# Patient Record
Sex: FEMALE | Race: OTHER | HISPANIC OR LATINO | Employment: FULL TIME | URBAN - METROPOLITAN AREA
[De-identification: names, ages, dates, MRNs, and addresses within clinical notes are randomized per-mention and may not be internally consistent; named-entity substitution may affect disease eponyms.]

---

## 2021-03-25 ENCOUNTER — OFFICE VISIT (OUTPATIENT)
Dept: OBGYN CLINIC | Facility: MEDICAL CENTER | Age: 21
End: 2021-03-25
Payer: COMMERCIAL

## 2021-03-25 ENCOUNTER — APPOINTMENT (OUTPATIENT)
Dept: RADIOLOGY | Facility: MEDICAL CENTER | Age: 21
End: 2021-03-25
Payer: COMMERCIAL

## 2021-03-25 VITALS
BODY MASS INDEX: 18.77 KG/M2 | HEART RATE: 69 BPM | SYSTOLIC BLOOD PRESSURE: 103 MMHG | HEIGHT: 62 IN | WEIGHT: 102 LBS | DIASTOLIC BLOOD PRESSURE: 69 MMHG

## 2021-03-25 DIAGNOSIS — M41.9 SCOLIOSIS, UNSPECIFIED SCOLIOSIS TYPE, UNSPECIFIED SPINAL REGION: ICD-10-CM

## 2021-03-25 DIAGNOSIS — M54.6 CHRONIC BILATERAL THORACIC BACK PAIN: ICD-10-CM

## 2021-03-25 DIAGNOSIS — R07.89 OTHER CHEST PAIN: Primary | ICD-10-CM

## 2021-03-25 DIAGNOSIS — G89.29 CHRONIC BILATERAL LOW BACK PAIN WITHOUT SCIATICA: ICD-10-CM

## 2021-03-25 DIAGNOSIS — M54.50 CHRONIC BILATERAL LOW BACK PAIN WITHOUT SCIATICA: ICD-10-CM

## 2021-03-25 DIAGNOSIS — G89.29 CHRONIC BILATERAL THORACIC BACK PAIN: ICD-10-CM

## 2021-03-25 DIAGNOSIS — M54.50 ACUTE BILATERAL LOW BACK PAIN WITHOUT SCIATICA: ICD-10-CM

## 2021-03-25 PROCEDURE — 99204 OFFICE O/P NEW MOD 45 MIN: CPT | Performed by: EMERGENCY MEDICINE

## 2021-03-25 PROCEDURE — 72100 X-RAY EXAM L-S SPINE 2/3 VWS: CPT

## 2021-03-25 PROCEDURE — 72070 X-RAY EXAM THORAC SPINE 2VWS: CPT

## 2021-03-25 NOTE — LETTER
March 25, 2021     Patient: Elke Marte   YOB: 1990   Date of Visit: 3/25/2021       To Whom it May Concern:    Elke Marte is under my professional care  She was seen in my office on 3/25/2021  Please accept restrictions at work: Max lifting 30 lbs repetitively  If you have any questions or concerns, please don't hesitate to call           Sincerely,          Emilie Miller MD        CC: No Recipients

## 2021-03-25 NOTE — PROGRESS NOTES
Assessment/Plan:    Diagnoses and all orders for this visit:    Other chest pain  -     XR spine thoracic 2 vw; Future  -     XR spine lumbar 2 or 3 views injury; Future  -     Ambulatory referral to Physical Therapy; Future  -     Ambulatory referral to [facility]; Future  -     XR chest pa & lateral; Future    Chronic bilateral low back pain without sciatica  -     XR spine thoracic 2 vw; Future  -     XR spine lumbar 2 or 3 views injury; Future  -     Ambulatory referral to Physical Therapy; Future    Chronic bilateral thoracic back pain  -     Ambulatory referral to Physical Therapy; Future    Other orders  -     Gianvi 3-0 02 MG per tablet; Take 1 tablet by mouth daily    Recommend PT for chronic intermittent back pain to help with work responsibilities including lifting/bending  CXR today for chest prominence, refer to PCP for further workup of CP  Return if symptoms worsen or fail to improve  Chief Complaint:     Chief Complaint   Patient presents with    Spine - Pain       Subjective:   Patient ID: Tyler Miranda is a 26 y o  female  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 for concerns of chest pain which she will notice when pulling, she also feels her right side of chest is more prominent  She describes it as a cold sensation and burning  She will sometimes experience SOB with this pain, these symtoms usually last about 10 seconds  Also with periods of neck, upper mid and lower back pain and feelings of cold  This is worse with lifting of in a position for periods of time  She notes when she was around 8 her chest bones was prominent and felt moving forward  Patient works at [employer] requiring her to perform repetitive lifting/bending and this can cause pain  She also notes recent b/l knee pain evaluated for this but severe causing her to cry  Review of Systems   Constitutional: Negative for fever  Respiratory: Positive for shortness of breath      Cardiovascular: Positive for chest pain  Gastrointestinal: Negative for abdominal pain  Genitourinary: Negative for difficulty urinating  Musculoskeletal: Positive for arthralgias and back pain  Skin: Negative for rash  Neurological: Negative for weakness  Psychiatric/Behavioral: Negative for suicidal ideas  The following portions of the patient's chart were reviewed and updated as appropriate: Allergy:  No Known Allergies    History reviewed  No pertinent past medical history  History reviewed  No pertinent surgical history  Social History     Socioeconomic History    Marital status: Unknown     Spouse name: Not on file    Number of children: Not on file    Years of education: Not on file    Highest education level: Not on file   Occupational History    Not on file   Social Needs    Financial resource strain: Not on file    Food insecurity     Worry: Not on file     Inability: Not on file    Transportation needs     Medical: Not on file     Non-medical: Not on file   Tobacco Use    Smoking status: Never Smoker    Smokeless tobacco: Never Used   Substance and Sexual Activity    Alcohol use: Not Currently    Drug use: Not Currently    Sexual activity: Not on file   Lifestyle    Physical activity     Days per week: Not on file     Minutes per session: Not on file    Stress: Not on file   Relationships    Social connections     Talks on phone: Not on file     Gets together: Not on file     Attends Jehovah's witness service: Not on file     Active member of club or organization: Not on file     Attends meetings of clubs or organizations: Not on file     Relationship status: Not on file    Intimate partner violence     Fear of current or ex partner: Not on file     Emotionally abused: Not on file     Physically abused: Not on file     Forced sexual activity: Not on file   Other Topics Concern    Not on file   Social History Narrative    Not on file       History reviewed   No pertinent family history  Medications:    Current Outpatient Medications:     Gianvi 3-0 02 MG per tablet, Take 1 tablet by mouth daily, Disp: , Rfl:     There is no problem list on file for this patient  Objective:  /69   Pulse 69   Ht 5' 2" (1 575 m)   Wt 46 3 kg (102 lb)   BMI 18 66 kg/m²     Back Exam     Range of Motion   Extension: normal   Flexion: normal     Other   Erythema: no back redness    Comments: There is mild right thoracic prominence on forward bending  Shoulders and hips level  Nl gait            Physical Exam  Vitals signs reviewed  Constitutional:       Appearance: She is well-developed  HENT:      Head: Normocephalic and atraumatic  Eyes:      Conjunctiva/sclera: Conjunctivae normal    Neck:      Musculoskeletal: Normal range of motion and neck supple  Cardiovascular:      Rate and Rhythm: Normal rate  Comments: Right anterior chest wall prominence  Pulmonary:      Effort: Pulmonary effort is normal  No respiratory distress  Skin:     General: Skin is warm and dry  Neurological:      Mental Status: She is alert and oriented to person, place, and time  Psychiatric:         Behavior: Behavior normal            Neurologic Exam     Mental Status   Oriented to person, place, and time  Procedures    I have personally reviewed pertinent films in PACS

## 2021-03-25 NOTE — LETTER
March 25, 2021     Patient: Danielito Carrion   YOB: 1990   Date of Visit: 3/25/2021       To Whom it May Concern:    Danielito Carrion is under my professional care  She was seen in my office on 3/25/2021  If you have any questions or concerns, please don't hesitate to call           Sincerely,          Saundra Bran MD        CC: No Recipients

## 2021-08-13 ENCOUNTER — OFFICE VISIT (OUTPATIENT)
Dept: FAMILY MEDICINE CLINIC | Facility: CLINIC | Age: 21
End: 2021-08-13
Payer: COMMERCIAL

## 2021-08-13 VITALS
BODY MASS INDEX: 18.03 KG/M2 | HEIGHT: 62 IN | RESPIRATION RATE: 14 BRPM | WEIGHT: 98 LBS | TEMPERATURE: 97.9 F | HEART RATE: 71 BPM | DIASTOLIC BLOOD PRESSURE: 62 MMHG | SYSTOLIC BLOOD PRESSURE: 90 MMHG | OXYGEN SATURATION: 100 %

## 2021-08-13 DIAGNOSIS — Z11.59 ENCOUNTER FOR HEPATITIS C SCREENING TEST FOR LOW RISK PATIENT: ICD-10-CM

## 2021-08-13 DIAGNOSIS — R06.02 SOB (SHORTNESS OF BREATH): ICD-10-CM

## 2021-08-13 DIAGNOSIS — M17.11 OSTEOARTHRITIS OF RIGHT KNEE, UNSPECIFIED OSTEOARTHRITIS TYPE: ICD-10-CM

## 2021-08-13 DIAGNOSIS — Z12.4 SCREENING FOR CERVICAL CANCER: ICD-10-CM

## 2021-08-13 DIAGNOSIS — K21.9 GASTROESOPHAGEAL REFLUX DISEASE WITHOUT ESOPHAGITIS: ICD-10-CM

## 2021-08-13 DIAGNOSIS — Z11.8 SCREENING FOR CHLAMYDIAL DISEASE: ICD-10-CM

## 2021-08-13 DIAGNOSIS — M17.12 OSTEOARTHRITIS OF LEFT KNEE, UNSPECIFIED OSTEOARTHRITIS TYPE: ICD-10-CM

## 2021-08-13 DIAGNOSIS — R07.9 CHEST PAIN, UNSPECIFIED TYPE: ICD-10-CM

## 2021-08-13 DIAGNOSIS — Z11.4 SCREENING FOR HIV (HUMAN IMMUNODEFICIENCY VIRUS): Primary | ICD-10-CM

## 2021-08-13 DIAGNOSIS — J32.9 CHRONIC SINUSITIS, UNSPECIFIED LOCATION: ICD-10-CM

## 2021-08-13 DIAGNOSIS — M19.90 ARTHRITIS: ICD-10-CM

## 2021-08-13 PROCEDURE — 99204 OFFICE O/P NEW MOD 45 MIN: CPT | Performed by: INTERNAL MEDICINE

## 2021-08-13 PROCEDURE — 1036F TOBACCO NON-USER: CPT | Performed by: INTERNAL MEDICINE

## 2021-08-13 PROCEDURE — 3008F BODY MASS INDEX DOCD: CPT | Performed by: INTERNAL MEDICINE

## 2021-08-13 PROCEDURE — 3725F SCREEN DEPRESSION PERFORMED: CPT | Performed by: INTERNAL MEDICINE

## 2021-08-13 NOTE — PROGRESS NOTES
BMI Counseling: Body mass index is 17 92 kg/m²  The BMI is above normal  Exercise recommendations include exercising 3-5 times per week

## 2021-08-13 NOTE — PROGRESS NOTES
Assessment/Plan:         Diagnoses and all orders for this visit:    Screening for HIV (human immunodeficiency virus)  -     HIV 1/2 Antigen/Antibody (4th Generation) w Reflex [facility]; Future    Encounter for hepatitis C screening test for low risk patient  -     Hepatitis C antibody; Future    Screening for chlamydial disease  -     Chlamydia/GC amplified DNA by PCR; Future    Screening for cervical cancer  -     Ambulatory referral to Gynecology; Future    Chest pain, unspecified type; chest wall syndrome  RTC in 1-2 mos  w:  -     CT chest wo contrast; Future  -     UA (URINE) with reflex to Scope; Future  -     Ferritin; Future  -     Comprehensive metabolic panel; Future  -     CBC and differential; Future  -     TSH, 3rd generation; Future  -     T4, free; Future  -     Magnesium; Future  -     Lipid panel; Future  -     Vitamin D 25 hydroxy; Future  -     Vitamin B12; Future    SOB (shortness of breath)  -     CT chest wo contrast; Future  -     Ferritin; Future  -     Comprehensive metabolic panel; Future  -     CBC and differential; Future  -     TSH, 3rd generation; Future  -     T4, free; Future  -     Magnesium; Future  -     Lipid panel; Future  -     Vitamin D 25 hydroxy; Future  -     Vitamin B12; Future  -     H  pylori antigen, stool; Future    Chronic sinusitis, unspecified location  -     CT sinus wo contrast; Future    Osteoarthritis of left knee, unspecified osteoarthritis type  -     XR knee 3 vw right non injury; Future  -     XR knee 3 vw left non injury; Future  -     XR hip/pelv 2-3 vws left if performed; Future  -     XR hip/pelv 2-3 vws right if performed; Future    Osteoarthritis of right knee, unspecified osteoarthritis type  -     XR knee 3 vw right non injury; Future  -     XR knee 3 vw left non injury; Future  -     XR hip/pelv 2-3 vws left if performed; Future  -     XR hip/pelv 2-3 vws right if performed; Future    Arthritis  -     XR hip/pelv 2-3 vws left if performed;  Future  - XR hip/pelv 2-3 vws right if performed; Future  -     Sedimentation rate, automated; Future  -     ASO Screen w/ reflex to Titer; Future  -     OPAL Screen w/ Reflex to Titer/Pattern; Future  -     Anti-DNA antibody, double-stranded; Future  -     Sjogren's Antibodies; Future  -     HLA-B27 antigen; Future    Gastroesophageal reflux disease without esophagitis  -     H  pylori antigen, stool; Future        Subjective:      Patient ID: Clint Monaco is a 19 y o  female  [address] is here for First time, Complete H/P discussed w Pt in detail, No recent Blood work, med list reviewed,       The following portions of the patient's history were reviewed and updated as appropriate: allergies, current medications, past medical history, past social history, past surgical history and problem list     Review of Systems   Constitutional: Negative for chills, fatigue and fever  HENT: Positive for postnasal drip  Negative for congestion, facial swelling, sore throat, trouble swallowing and voice change  Eyes: Negative for pain, discharge and visual disturbance  Respiratory: Positive for shortness of breath  Negative for cough and wheezing  Cardiovascular: Positive for chest pain  Negative for palpitations and leg swelling  Gastrointestinal: Negative for abdominal pain, blood in stool, constipation, diarrhea and nausea  Endocrine: Negative for polydipsia, polyphagia and polyuria  Genitourinary: Negative for difficulty urinating, hematuria and urgency  Musculoskeletal: Positive for arthralgias and back pain  Negative for myalgias  Skin: Negative for rash  Neurological: Negative for dizziness, tremors, weakness and headaches  Hematological: Negative for adenopathy  Does not bruise/bleed easily  Psychiatric/Behavioral: Negative for dysphoric mood, sleep disturbance and suicidal ideas           Objective:      BP 90/62 (BP Location: Left arm, Patient Position: Sitting, Cuff Size: Standard)   Pulse 71   Temp 97 9 °F (36 6 °C) (Tympanic)   Resp 14   Ht 5' 2" (1 575 m)   Wt 44 5 kg (98 lb)   SpO2 100%   BMI 17 92 kg/m²          Physical Exam  Constitutional:       General: She is not in acute distress  HENT:      Head: Normocephalic  Mouth/Throat:      Pharynx: No oropharyngeal exudate  Eyes:      General: No scleral icterus  Conjunctiva/sclera: Conjunctivae normal       Pupils: Pupils are equal, round, and reactive to light  Neck:      Thyroid: No thyromegaly  Cardiovascular:      Rate and Rhythm: Normal rate and regular rhythm  Heart sounds: Normal heart sounds  No murmur heard  Pulmonary:      Effort: Pulmonary effort is normal  No respiratory distress  Breath sounds: Normal breath sounds  No wheezing or rales  Abdominal:      General: Bowel sounds are normal  There is no distension  Palpations: Abdomen is soft  Tenderness: There is no abdominal tenderness  There is no guarding or rebound  Musculoskeletal:         General: Tenderness present  Cervical back: Neck supple  Lymphadenopathy:      Cervical: No cervical adenopathy  Skin:     Coloration: Skin is not pale  Findings: No rash  Neurological:      Mental Status: She is alert and oriented to person, place, and time  Sensory: No sensory deficit  Motor: No weakness

## 2022-02-01 ENCOUNTER — APPOINTMENT (OUTPATIENT)
Dept: LAB | Facility: HOSPITAL | Age: 22
End: 2022-02-01
Payer: COMMERCIAL

## 2022-02-01 DIAGNOSIS — R07.9 CHEST PAIN, UNSPECIFIED TYPE: ICD-10-CM

## 2022-02-01 DIAGNOSIS — K21.9 GASTROESOPHAGEAL REFLUX DISEASE WITHOUT ESOPHAGITIS: ICD-10-CM

## 2022-02-01 DIAGNOSIS — R06.02 SOB (SHORTNESS OF BREATH): ICD-10-CM

## 2022-02-01 DIAGNOSIS — M19.90 ARTHRITIS: ICD-10-CM

## 2022-02-01 LAB
25(OH)D3 SERPL-MCNC: 8.4 NG/ML (ref 30–100)
ALBUMIN SERPL BCP-MCNC: 3.8 G/DL (ref 3.5–5)
ALP SERPL-CCNC: 39 U/L (ref 46–116)
ALT SERPL W P-5'-P-CCNC: 20 U/L (ref 12–78)
ANION GAP SERPL CALCULATED.3IONS-SCNC: 10 MMOL/L (ref 4–13)
AST SERPL W P-5'-P-CCNC: 14 U/L (ref 5–45)
BASOPHILS # BLD AUTO: 0.03 THOUSANDS/ΜL (ref 0–0.1)
BASOPHILS NFR BLD AUTO: 1 % (ref 0–1)
BILIRUB SERPL-MCNC: 0.27 MG/DL (ref 0.2–1)
BUN SERPL-MCNC: 11 MG/DL (ref 5–25)
CALCIUM SERPL-MCNC: 9 MG/DL (ref 8.3–10.1)
CHLORIDE SERPL-SCNC: 104 MMOL/L (ref 100–108)
CHOLEST SERPL-MCNC: 249 MG/DL
CO2 SERPL-SCNC: 26 MMOL/L (ref 21–32)
CREAT SERPL-MCNC: 0.8 MG/DL (ref 0.6–1.3)
EOSINOPHIL # BLD AUTO: 0.07 THOUSAND/ΜL (ref 0–0.61)
EOSINOPHIL NFR BLD AUTO: 1 % (ref 0–6)
ERYTHROCYTE [DISTWIDTH] IN BLOOD BY AUTOMATED COUNT: 12.9 % (ref 11.6–15.1)
ERYTHROCYTE [SEDIMENTATION RATE] IN BLOOD: 12 MM/HOUR (ref 0–19)
FERRITIN SERPL-MCNC: 15 NG/ML (ref 8–388)
GFR SERPL CREATININE-BSD FRML MDRD: 104 ML/MIN/1.73SQ M
GLUCOSE P FAST SERPL-MCNC: 79 MG/DL (ref 65–99)
HCT VFR BLD AUTO: 44.5 % (ref 34.8–46.1)
HDLC SERPL-MCNC: 57 MG/DL
HGB BLD-MCNC: 14.7 G/DL (ref 11.5–15.4)
IMM GRANULOCYTES # BLD AUTO: 0.02 THOUSAND/UL (ref 0–0.2)
IMM GRANULOCYTES NFR BLD AUTO: 0 % (ref 0–2)
LDLC SERPL CALC-MCNC: 156 MG/DL (ref 0–100)
LYMPHOCYTES # BLD AUTO: 1.58 THOUSANDS/ΜL (ref 0.6–4.47)
LYMPHOCYTES NFR BLD AUTO: 32 % (ref 14–44)
MAGNESIUM SERPL-MCNC: 2 MG/DL (ref 1.6–2.6)
MCH RBC QN AUTO: 29.8 PG (ref 26.8–34.3)
MCHC RBC AUTO-ENTMCNC: 33 G/DL (ref 31.4–37.4)
MCV RBC AUTO: 90 FL (ref 82–98)
MONOCYTES # BLD AUTO: 0.5 THOUSAND/ΜL (ref 0.17–1.22)
MONOCYTES NFR BLD AUTO: 10 % (ref 4–12)
NEUTROPHILS # BLD AUTO: 2.82 THOUSANDS/ΜL (ref 1.85–7.62)
NEUTS SEG NFR BLD AUTO: 56 % (ref 43–75)
NONHDLC SERPL-MCNC: 192 MG/DL
NRBC BLD AUTO-RTO: 0 /100 WBCS
PLATELET # BLD AUTO: 274 THOUSANDS/UL (ref 149–390)
PMV BLD AUTO: 9.8 FL (ref 8.9–12.7)
POTASSIUM SERPL-SCNC: 3.8 MMOL/L (ref 3.5–5.3)
PROT SERPL-MCNC: 8 G/DL (ref 6.4–8.2)
RBC # BLD AUTO: 4.93 MILLION/UL (ref 3.81–5.12)
SODIUM SERPL-SCNC: 140 MMOL/L (ref 136–145)
T4 FREE SERPL-MCNC: 1.06 NG/DL (ref 0.76–1.46)
TRIGL SERPL-MCNC: 178 MG/DL
TSH SERPL DL<=0.05 MIU/L-ACNC: 0.91 UIU/ML (ref 0.36–3.74)
VIT B12 SERPL-MCNC: 741 PG/ML (ref 100–900)
WBC # BLD AUTO: 5.02 THOUSAND/UL (ref 4.31–10.16)

## 2022-02-01 PROCEDURE — 86225 DNA ANTIBODY NATIVE: CPT

## 2022-02-01 PROCEDURE — 85652 RBC SED RATE AUTOMATED: CPT

## 2022-02-01 PROCEDURE — 86038 ANTINUCLEAR ANTIBODIES: CPT

## 2022-02-01 PROCEDURE — 84443 ASSAY THYROID STIM HORMONE: CPT

## 2022-02-01 PROCEDURE — 81374 HLA I TYPING 1 ANTIGEN LR: CPT

## 2022-02-01 PROCEDURE — 82728 ASSAY OF FERRITIN: CPT

## 2022-02-01 PROCEDURE — 86063 ANTISTREPTOLYSIN O SCREEN: CPT

## 2022-02-01 PROCEDURE — 86235 NUCLEAR ANTIGEN ANTIBODY: CPT

## 2022-02-01 PROCEDURE — 85025 COMPLETE CBC W/AUTO DIFF WBC: CPT

## 2022-02-01 PROCEDURE — 80053 COMPREHEN METABOLIC PANEL: CPT

## 2022-02-01 PROCEDURE — 84439 ASSAY OF FREE THYROXINE: CPT

## 2022-02-01 PROCEDURE — 82306 VITAMIN D 25 HYDROXY: CPT

## 2022-02-01 PROCEDURE — 83735 ASSAY OF MAGNESIUM: CPT

## 2022-02-01 PROCEDURE — 36415 COLL VENOUS BLD VENIPUNCTURE: CPT

## 2022-02-01 PROCEDURE — 82607 VITAMIN B-12: CPT

## 2022-02-01 PROCEDURE — 80061 LIPID PANEL: CPT

## 2022-02-02 LAB
ASO AB TITR SER LA: NORMAL {TITER}
DSDNA AB SER-ACNC: 1 IU/ML (ref 0–9)
ENA SS-A AB SER-ACNC: <0.2 AI (ref 0–0.9)
ENA SS-B AB SER-ACNC: <0.2 AI (ref 0–0.9)
RYE IGE QN: NEGATIVE

## 2022-02-07 ENCOUNTER — OFFICE VISIT (OUTPATIENT)
Dept: FAMILY MEDICINE CLINIC | Facility: CLINIC | Age: 22
End: 2022-02-07
Payer: COMMERCIAL

## 2022-02-07 VITALS
SYSTOLIC BLOOD PRESSURE: 120 MMHG | HEIGHT: 62 IN | RESPIRATION RATE: 14 BRPM | WEIGHT: 96 LBS | DIASTOLIC BLOOD PRESSURE: 66 MMHG | TEMPERATURE: 98.2 F | HEART RATE: 72 BPM | OXYGEN SATURATION: 99 % | BODY MASS INDEX: 17.66 KG/M2

## 2022-02-07 DIAGNOSIS — R79.89 LOW VITAMIN D LEVEL: ICD-10-CM

## 2022-02-07 DIAGNOSIS — Z00.01 ENCOUNTER FOR GENERAL ADULT MEDICAL EXAMINATION WITH ABNORMAL FINDINGS: Primary | ICD-10-CM

## 2022-02-07 DIAGNOSIS — J34.2 DEVIATED NASAL SEPTUM: ICD-10-CM

## 2022-02-07 DIAGNOSIS — E04.1 THYROID NODULE: ICD-10-CM

## 2022-02-07 DIAGNOSIS — E78.49 OTHER HYPERLIPIDEMIA: ICD-10-CM

## 2022-02-07 DIAGNOSIS — R63.4 WEIGHT LOSS: ICD-10-CM

## 2022-02-07 DIAGNOSIS — R06.02 SOB (SHORTNESS OF BREATH): ICD-10-CM

## 2022-02-07 DIAGNOSIS — M19.90 ARTHRITIS: ICD-10-CM

## 2022-02-07 DIAGNOSIS — R07.9 CHEST PAIN, UNSPECIFIED TYPE: ICD-10-CM

## 2022-02-07 PROCEDURE — 99214 OFFICE O/P EST MOD 30 MIN: CPT | Performed by: INTERNAL MEDICINE

## 2022-02-07 PROCEDURE — 1036F TOBACCO NON-USER: CPT | Performed by: INTERNAL MEDICINE

## 2022-02-07 PROCEDURE — 99395 PREV VISIT EST AGE 18-39: CPT | Performed by: INTERNAL MEDICINE

## 2022-02-07 PROCEDURE — 3008F BODY MASS INDEX DOCD: CPT | Performed by: INTERNAL MEDICINE

## 2022-02-07 RX ORDER — ERGOCALCIFEROL 1.25 MG/1
50000 CAPSULE ORAL WEEKLY
Qty: 13 CAPSULE | Refills: 3 | Status: SHIPPED | OUTPATIENT
Start: 2022-02-07 | End: 2022-03-29 | Stop reason: SDUPTHER

## 2022-02-07 NOTE — LETTER
February 7, 2022     Patient: Jesus Hunter   YOB: 2000   Date of Visit: 2/7/2022       To Whom it May Concern:    Jesus Hunter is under my professional care  She was seen in my office on 2/7/2022  She may not work more than eight (8) hours per day five (5) days per week pending re-evaluation at her next appointment on Friday, April 8, 2022  If you have any questions or concerns, please don't hesitate to call           Sincerely,            Cecil Vasquez MD        CC: No Recipients

## 2022-02-07 NOTE — PROGRESS NOTES
Assessment/Plan:         Diagnoses and all orders for this visit:    Encounter for general adult medical examination with abnormal findings; done in detail    Chest pain, unspecified type; RTC in 1 mo w Ct scan chest wo    SOB (shortness of breath); as above    Arthritis; Moist heat  RTC in 1 mos w X rays    Other hyperlipidemia; life style Mod  RTC in 3 mos w Blood work    Low vitamin D level; start :  -     ergocalciferol (VITAMIN D2) 50,000 units; Take 1 capsule (50,000 Units total) by mouth once a week    Weight loss; cause ? RTC in 1 mo w Blood work  -     Lipase; Future  -     US abdomen complete; Future  Pt has to NOT to work more than 8 hours per day, 5 days per week    Thyroid nodule; RTC in 1 mo w :  -     TSH, 3rd generation; Future  -     T4, free; Future  -     Thyroid Antibodies Panel; Future  -     US thyroid; Future    Deviated nasal septum;  -     Ambulatory Referral to Plastic Surgery; Future        Subjective:      Patient ID: Theodora Brice is a 19 y o  female  [address] [place] [address] Road is here for Annual physical exam and regular check up, She has few symptoms, recent blood work and med list reviewed w Pt,  The following portions of the patient's history were reviewed and updated as appropriate: allergies, current medications, past medical history, past social history, past surgical history and problem list     Review of Systems   Constitutional: Positive for appetite change, fatigue and unexpected weight change  Negative for chills and fever  HENT: Positive for postnasal drip  Negative for congestion, facial swelling, sore throat, trouble swallowing and voice change  Eyes: Negative for pain, discharge and visual disturbance  Respiratory: Negative for cough, shortness of breath and wheezing  Cardiovascular: Negative for chest pain, palpitations and leg swelling  Gastrointestinal: Negative for abdominal pain, blood in stool, constipation, diarrhea and nausea     Endocrine: Negative for polydipsia, polyphagia and polyuria  Genitourinary: Negative for difficulty urinating, hematuria and urgency  Musculoskeletal: Negative for arthralgias and myalgias  Skin: Negative for rash  Neurological: Negative for dizziness, tremors, weakness and headaches  Hematological: Negative for adenopathy  Does not bruise/bleed easily  Psychiatric/Behavioral: Negative for dysphoric mood, sleep disturbance and suicidal ideas  Objective:      /66 (BP Location: Left arm, Patient Position: Sitting, Cuff Size: Standard)   Pulse 72   Temp 98 2 °F (36 8 °C) (Tympanic)   Resp 14   Ht 5' 2" (1 575 m)   Wt 43 5 kg (96 lb)   SpO2 99%   BMI 17 56 kg/m²          Physical Exam  Constitutional:       General: She is not in acute distress  HENT:      Head: Normocephalic  Mouth/Throat:      Pharynx: No oropharyngeal exudate  Eyes:      General: No scleral icterus  Conjunctiva/sclera: Conjunctivae normal       Pupils: Pupils are equal, round, and reactive to light  Neck:      Thyroid: No thyromegaly  Cardiovascular:      Rate and Rhythm: Normal rate and regular rhythm  Heart sounds: Normal heart sounds  No murmur heard  Pulmonary:      Effort: Pulmonary effort is normal  No respiratory distress  Breath sounds: Normal breath sounds  No wheezing or rales  Abdominal:      General: Bowel sounds are normal  There is no distension  Palpations: Abdomen is soft  Tenderness: There is no abdominal tenderness  There is no guarding or rebound  Musculoskeletal:         General: No tenderness  Cervical back: Neck supple  Lymphadenopathy:      Cervical: No cervical adenopathy  Skin:     Coloration: Skin is not pale  Findings: No rash  Neurological:      Mental Status: She is alert and oriented to person, place, and time  Sensory: No sensory deficit  Motor: No weakness

## 2022-02-11 LAB — HLA-B27 QL NAA+PROBE: NEGATIVE

## 2022-03-24 ENCOUNTER — HOSPITAL ENCOUNTER (OUTPATIENT)
Dept: RADIOLOGY | Facility: HOSPITAL | Age: 22
Discharge: HOME | End: 2022-03-24
Payer: COMMERCIAL

## 2022-03-24 ENCOUNTER — APPOINTMENT (OUTPATIENT)
Dept: LAB | Facility: HOSPITAL | Age: 22
End: 2022-03-24
Payer: COMMERCIAL

## 2022-03-24 DIAGNOSIS — M17.11 OSTEOARTHRITIS OF RIGHT KNEE, UNSPECIFIED OSTEOARTHRITIS TYPE: ICD-10-CM

## 2022-03-24 DIAGNOSIS — R63.4 WEIGHT LOSS: ICD-10-CM

## 2022-03-24 DIAGNOSIS — E04.1 THYROID NODULE: ICD-10-CM

## 2022-03-24 DIAGNOSIS — M17.12 OSTEOARTHRITIS OF LEFT KNEE, UNSPECIFIED OSTEOARTHRITIS TYPE: ICD-10-CM

## 2022-03-24 DIAGNOSIS — M19.90 ARTHRITIS: ICD-10-CM

## 2022-03-24 LAB
LIPASE SERPL-CCNC: 119 U/L (ref 73–393)
T4 FREE SERPL-MCNC: 1.04 NG/DL (ref 0.76–1.46)
TSH SERPL DL<=0.05 MIU/L-ACNC: 1.25 UIU/ML (ref 0.36–3.74)

## 2022-03-24 PROCEDURE — 84443 ASSAY THYROID STIM HORMONE: CPT

## 2022-03-24 PROCEDURE — 73502 X-RAY EXAM HIP UNI 2-3 VIEWS: CPT

## 2022-03-24 PROCEDURE — 73562 X-RAY EXAM OF KNEE 3: CPT

## 2022-03-24 PROCEDURE — 86376 MICROSOMAL ANTIBODY EACH: CPT

## 2022-03-24 PROCEDURE — 84439 ASSAY OF FREE THYROXINE: CPT

## 2022-03-24 PROCEDURE — 83690 ASSAY OF LIPASE: CPT

## 2022-03-24 PROCEDURE — 36415 COLL VENOUS BLD VENIPUNCTURE: CPT

## 2022-03-24 PROCEDURE — 86800 THYROGLOBULIN ANTIBODY: CPT

## 2022-03-25 LAB
THYROGLOB AB SERPL-ACNC: <1 IU/ML (ref 0–0.9)
THYROPEROXIDASE AB SERPL-ACNC: 13 IU/ML (ref 0–34)

## 2022-03-29 ENCOUNTER — OFFICE VISIT (OUTPATIENT)
Dept: FAMILY MEDICINE CLINIC | Facility: CLINIC | Age: 22
End: 2022-03-29
Payer: COMMERCIAL

## 2022-03-29 VITALS
DIASTOLIC BLOOD PRESSURE: 68 MMHG | HEART RATE: 96 BPM | BODY MASS INDEX: 18.69 KG/M2 | OXYGEN SATURATION: 97 % | TEMPERATURE: 97.8 F | HEIGHT: 62 IN | SYSTOLIC BLOOD PRESSURE: 100 MMHG | WEIGHT: 101.6 LBS

## 2022-03-29 DIAGNOSIS — M17.12 OSTEOARTHRITIS OF LEFT KNEE, UNSPECIFIED OSTEOARTHRITIS TYPE: ICD-10-CM

## 2022-03-29 DIAGNOSIS — M19.90 ARTHRITIS: Primary | ICD-10-CM

## 2022-03-29 DIAGNOSIS — R79.89 LOW VITAMIN D LEVEL: ICD-10-CM

## 2022-03-29 DIAGNOSIS — G62.9 NEUROPATHY: ICD-10-CM

## 2022-03-29 DIAGNOSIS — Z12.4 SCREENING FOR CERVICAL CANCER: ICD-10-CM

## 2022-03-29 DIAGNOSIS — M54.12 CERVICAL RADICULOPATHY: ICD-10-CM

## 2022-03-29 PROCEDURE — 3725F SCREEN DEPRESSION PERFORMED: CPT | Performed by: INTERNAL MEDICINE

## 2022-03-29 PROCEDURE — 1036F TOBACCO NON-USER: CPT | Performed by: INTERNAL MEDICINE

## 2022-03-29 PROCEDURE — 99214 OFFICE O/P EST MOD 30 MIN: CPT | Performed by: INTERNAL MEDICINE

## 2022-03-29 PROCEDURE — 3008F BODY MASS INDEX DOCD: CPT | Performed by: INTERNAL MEDICINE

## 2022-03-29 RX ORDER — NORETHINDRONE ACETATE AND ETHINYL ESTRADIOL AND FERROUS FUMARATE 1MG-20(21)
KIT ORAL
COMMUNITY
Start: 2022-03-28

## 2022-03-29 RX ORDER — ERGOCALCIFEROL 1.25 MG/1
50000 CAPSULE ORAL WEEKLY
Qty: 13 CAPSULE | Refills: 3 | Status: SHIPPED | OUTPATIENT
Start: 2022-03-29

## 2022-03-29 RX ORDER — CELECOXIB 200 MG/1
200 CAPSULE ORAL DAILY
Qty: 30 CAPSULE | Refills: 2 | Status: SHIPPED | OUTPATIENT
Start: 2022-03-29

## 2022-03-29 NOTE — LETTER
March 29, 2022     Patient: Anselmo Sanchez   YOB: 2000   Date of Visit: 3/29/2022       To Whom it May Concern:    Anselmo Sanchez is under my professional care  She was seen in my office on 3/29/2022  She may return to work on 3/30/22 for 8 hours a day 5 times a week  If you have any questions or concerns, please don't hesitate to call           Sincerely,          Romulo Jaimes MD        CC: No Recipients

## 2022-03-29 NOTE — PROGRESS NOTES
Assessment/Plan:         Diagnoses and all orders for this visit:    Screening for cervical cancer; pt refused    Low vitamin D level; Renew :  -     ergocalciferol (VITAMIN D2) 50,000 units; Take 1 capsule (50,000 Units total) by mouth once a week    Arthritis  -     Ambulatory Referral to Orthopedic Surgery; Future  -     celecoxib (CeleBREX) 200 mg capsule; Take 1 capsule (200 mg total) by mouth daily With food/breakfast  -     Ambulatory Referral to Physical Therapy; Future    Osteoarthritis of left knee, unspecified osteoarthritis type;  -     Ambulatory Referral to Orthopedic Surgery; Future  Try :  -     celecoxib (CeleBREX) 200 mg capsule; Take 1 capsule (200 mg total) by mouth daily With food/breakfast  -     Ambulatory Referral to Physical Therapy; Future    Neuropathy; left upper ext;RTC in 1-2 mos w :  -     EMG 2 Limb Upper Extremity; Future  -     MRI cervical spine wo contrast; Future  -     b complex-C-E-zinc (STRESS FORMULA/ZINC) tablet; Take 1 tablet by mouth daily    Cervical radiculopathy; as above,  -     EMG 2 Limb Upper Extremity; Future  -     MRI cervical spine wo contrast; Future  -     b complex-C-E-zinc (STRESS FORMULA/ZINC) tablet; Take 1 tablet by mouth daily    Other orders  -     Junel FE 1/20 1-20 MG-MCG per tablet        Subjective:      Patient ID: Leah Gerber is a 19 y o  female  [address] [place] [address] Road is here for Regular check up, she has few symptoms, recent blood work and med list reviewed w Pt in detail    The following portions of the patient's history were reviewed and updated as appropriate: allergies, past family history, past medical history, past social history, past surgical history and problem list     Review of Systems   Constitutional: Negative for chills, fatigue and fever  HENT: Negative for congestion, facial swelling, sore throat, trouble swallowing and voice change  Eyes: Positive for visual disturbance  Negative for pain and discharge     Respiratory: Negative for cough, shortness of breath and wheezing  Cardiovascular: Negative for chest pain, palpitations and leg swelling  Gastrointestinal: Negative for abdominal pain, blood in stool, constipation, diarrhea and nausea  Endocrine: Negative for polydipsia, polyphagia and polyuria  Genitourinary: Negative for difficulty urinating, hematuria and urgency  Musculoskeletal: Positive for arthralgias and neck pain  Negative for myalgias  Skin: Negative for rash  Neurological: Positive for numbness  Negative for dizziness, tremors, weakness and headaches  Hematological: Negative for adenopathy  Does not bruise/bleed easily  Psychiatric/Behavioral: Negative for dysphoric mood, sleep disturbance and suicidal ideas  Objective:      /68 (BP Location: Left arm, Patient Position: Sitting, Cuff Size: Adult)   Pulse 96   Temp 97 8 °F (36 6 °C) (Probe)   Ht 5' 2" (1 575 m)   Wt 46 1 kg (101 lb 9 6 oz)   SpO2 97%   BMI 18 58 kg/m²          Physical Exam  Constitutional:       General: She is not in acute distress  HENT:      Head: Normocephalic  Mouth/Throat:      Pharynx: No oropharyngeal exudate  Eyes:      General: No scleral icterus  Conjunctiva/sclera: Conjunctivae normal       Pupils: Pupils are equal, round, and reactive to light  Neck:      Thyroid: No thyromegaly  Cardiovascular:      Rate and Rhythm: Normal rate and regular rhythm  Heart sounds: Normal heart sounds  No murmur heard  Pulmonary:      Effort: Pulmonary effort is normal  No respiratory distress  Breath sounds: Normal breath sounds  No wheezing or rales  Abdominal:      General: Bowel sounds are normal  There is no distension  Palpations: Abdomen is soft  Tenderness: There is no abdominal tenderness  There is no guarding or rebound  Musculoskeletal:         General: Tenderness present  Cervical back: Neck supple  Lymphadenopathy:      Cervical: No cervical adenopathy  Skin:     Coloration: Skin is not pale  Findings: No rash  Neurological:      Mental Status: She is alert and oriented to person, place, and time  Sensory: Sensory deficit present  Motor: No weakness

## 2022-04-04 ENCOUNTER — TELEPHONE (OUTPATIENT)
Dept: FAMILY MEDICINE CLINIC | Facility: CLINIC | Age: 22
End: 2022-04-04

## 2022-04-04 NOTE — TELEPHONE ENCOUNTER
PT was given a note on last week  that she was to work no more than 8 hrs a day 5 days a week for 2 monmths. Pt wants to know if we can extend this note indefinitely This schedule makes her feel a lot better.  . Please call pt back to advise

## 2022-04-06 ENCOUNTER — TELEPHONE (OUTPATIENT)
Dept: FAMILY MEDICINE CLINIC | Facility: CLINIC | Age: 22
End: 2022-04-06

## 2022-04-06 NOTE — TELEPHONE ENCOUNTER
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

## 2022-04-08 ENCOUNTER — OFFICE VISIT (OUTPATIENT)
Dept: OBGYN CLINIC | Facility: MEDICAL CENTER | Age: 22
End: 2022-04-08
Payer: COMMERCIAL

## 2022-04-08 VITALS
DIASTOLIC BLOOD PRESSURE: 75 MMHG | HEIGHT: 61 IN | HEART RATE: 87 BPM | BODY MASS INDEX: 19.07 KG/M2 | WEIGHT: 101 LBS | SYSTOLIC BLOOD PRESSURE: 119 MMHG

## 2022-04-08 DIAGNOSIS — M25.561 CHRONIC PAIN OF BOTH KNEES: Primary | ICD-10-CM

## 2022-04-08 DIAGNOSIS — G89.29 CHRONIC PAIN OF BOTH KNEES: Primary | ICD-10-CM

## 2022-04-08 DIAGNOSIS — M22.2X9 DISORDER OF PATELLOFEMORAL JOINT, UNSPECIFIED LATERALITY: ICD-10-CM

## 2022-04-08 DIAGNOSIS — M25.562 CHRONIC PAIN OF BOTH KNEES: Primary | ICD-10-CM

## 2022-04-08 PROCEDURE — 99213 OFFICE O/P EST LOW 20 MIN: CPT | Performed by: EMERGENCY MEDICINE

## 2022-04-08 PROCEDURE — 1036F TOBACCO NON-USER: CPT | Performed by: EMERGENCY MEDICINE

## 2022-04-08 PROCEDURE — 3008F BODY MASS INDEX DOCD: CPT | Performed by: EMERGENCY MEDICINE

## 2022-04-08 NOTE — PROGRESS NOTES
Assessment/Plan:    Diagnoses and all orders for this visit:    Chronic pain of both knees  -     Ambulatory Referral to Orthopedic Surgery    Disorder of patellofemoral joint, unspecified laterality    Patient to start PT  Discussed starting Celebrex  Reviewed Xrays and PCP note  Work note with requested permanent restrictions    Return in about 6 weeks (around 5/20/2022)  Chief Complaint:     Chief Complaint   Patient presents with    Left Knee - Pain       Subjective:   Patient ID: Christina Pierce is a 19 y o  female  NP presents referred by PCP for chronic b/l knee pain, mostly anterior, numb and sharp feeling  She also notes lateral left knee pain  Pain is intermittent and there are some days she has no pain  She did have Xrays, and has been evaluated with outside ortho dx with PFS was referred to PT but did not start it  However she is now scheduled for PT  Denies there being a correlation with increased symptoms in morning, no hx lyme  Also notes intermittent lower back pain  She likes to lift weights and exercise  She works at [employer] and is requesting permanent restrictions to work fiver 8 hour days, as well as no picking  Review of Systems    The following portions of the patient's chart were reviewed and updated as appropriate: Allergy:  No Known Allergies    History reviewed  No pertinent past medical history  History reviewed  No pertinent surgical history      Social History     Socioeconomic History    Marital status: Single     Spouse name: Not on file    Number of children: Not on file    Years of education: Not on file    Highest education level: Not on file   Occupational History    Not on file   Tobacco Use    Smoking status: Never Smoker    Smokeless tobacco: Never Used   Vaping Use    Vaping Use: Never used   Substance and Sexual Activity    Alcohol use: Not Currently    Drug use: Not Currently    Sexual activity: Not on file   Other Topics Concern    Not on file   Social History Narrative    Not on file     Social Determinants of Health     Financial Resource Strain: Not on file   Food Insecurity: Not on file   Transportation Needs: Not on file   Physical Activity: Not on file   Stress: Not on file   Social Connections: Not on file   Intimate Partner Violence: Not on file   Housing Stability: Not on file       Family History   Family history unknown: Yes       Medications:    Current Outpatient Medications:     b complex-C-E-zinc (STRESS FORMULA/ZINC) tablet, Take 1 tablet by mouth daily, Disp: 90 tablet, Rfl: 3    celecoxib (CeleBREX) 200 mg capsule, Take 1 capsule (200 mg total) by mouth daily With food/breakfast, Disp: 30 capsule, Rfl: 2    ergocalciferol (VITAMIN D2) 50,000 units, Take 1 capsule (50,000 Units total) by mouth once a week, Disp: 13 capsule, Rfl: 3    Junel FE 1/20 1-20 MG-MCG per tablet, , Disp: , Rfl:     There is no problem list on file for this patient  Objective:  /75   Pulse 87   Ht 5' 1" (1 549 m)   Wt 45 8 kg (101 lb)   BMI 19 08 kg/m²     Right Knee Exam     Tenderness   The patient is experiencing no tenderness  Range of Motion   Extension: normal     Tests   Celestine:  Medial - negative Lateral - negative    Other   Erythema: absent  Effusion: no effusion present    Comments:  + mild J tracking b/l      Left Knee Exam     Tenderness   The patient is experiencing no tenderness  Range of Motion   Extension: normal     Tests   Celestine:  Medial - negative Lateral - negative    Other   Erythema: absent  Effusion: no effusion present          Observations   Left Knee   Negative for effusion  Right Knee   Negative for effusion  Physical Exam  Musculoskeletal:      Right knee: No effusion  Instability Tests: Medial Celestine test negative and lateral Celestine test negative  Left knee: No effusion  Instability Tests: Medial Celestine test negative and lateral Celestine test negative  Neurologic Exam    Procedures    I have personally reviewed the written report of the pertinent studies   Xrays b/l hips and knees wnl

## 2022-04-08 NOTE — LETTER
April 8, 2022     Bibi Garay MD  [address] [facility] [address]    Patient: Kaci Bryant   YOB: 2000   Date of Visit: 4/8/2022       Dear Dr [facility]: Thank you for referring Kaci Bryant to me for evaluation  Below are the relevant portions of my assessment and plan of care  If you have questions, please do not hesitate to call me  I look forward to following Wade Martinez along with you           Sincerely,        Fredrick Hendrix MD        CC: No Recipients

## 2022-04-08 NOTE — PATIENT INSTRUCTIONS
While taking Celebrex (celecoxib) do not take any other NSAIDs such as Advil, Motrin, ibuprofen, naproxen or Aleve  However you may take Tylenol    Along with Advil or Aleve, you may also take Tylenol 500mg every 4-6 hours as needed OR max 1,000mg per dose up to 3 times per day for a total of 3,000mg per day

## 2022-04-08 NOTE — LETTER
April 8, 2022     Patient: Re Mark   YOB: 2000   Date of Visit: 4/8/2022       To Whom it May Concern:    Re Mark is under my professional care  She was seen in my office on 4/8/2022  Please allow no 'picking' at work, and please allow this as permanent  If you have any questions or concerns, please don't hesitate to call           Sincerely,          Morton Gilford, MD        CC: No Recipients

## 2022-04-15 ENCOUNTER — EVALUATION (OUTPATIENT)
Dept: PHYSICAL THERAPY | Facility: REHABILITATION | Age: 22
End: 2022-04-15

## 2022-04-15 DIAGNOSIS — M25.561 RIGHT KNEE PAIN, UNSPECIFIED CHRONICITY: ICD-10-CM

## 2022-04-15 DIAGNOSIS — M25.562 LEFT KNEE PAIN, UNSPECIFIED CHRONICITY: Primary | ICD-10-CM

## 2022-04-15 DIAGNOSIS — M17.12 OSTEOARTHRITIS OF LEFT KNEE, UNSPECIFIED OSTEOARTHRITIS TYPE: ICD-10-CM

## 2022-04-15 PROCEDURE — 97161 PT EVAL LOW COMPLEX 20 MIN: CPT | Performed by: PHYSICAL THERAPIST

## 2022-04-15 NOTE — PROGRESS NOTES
PT Evaluation  and PT Discharge    Patient canceled all appointments due to financial reasons and has elected to self-discharge  Today's date: 4/15/2022  Patient name: Jeaneth Fowler  : 2000  MRN: [id number]  Referring provider: Polo King MD  Dx:   Encounter Diagnosis     ICD-10-CM    1  Left knee pain, unspecified chronicity  M25 562    2  Right knee pain, unspecified chronicity  M25 561    3  Osteoarthritis of left knee, unspecified osteoarthritis type  M17 12 Ambulatory Referral to Physical Therapy       Start Time: 1030  Stop Time: 1120  Total time in clinic (min): 50 minutes    Assessment  Assessment details: Patient is a 19 y o  female presenting to initial examination with chief complaint of bilateral knee pain with L worse than R  Primary impairments include hip abduction and extension strength deficits and poor neuromuscular control of knee during single leg activities  As a result of impairments patient experiences limitations with functional/daily activities including squatting, stair navigation, lifting/carrying while working, and prolonged ambulation  Patient achieved FOTO score of 38  Educated patient regarding plan of care and answered all patient questions to patient satisfaction  Patient would benefit from skilled PT interventions to address above impairments in order to maximize functional capacity   Plan one visit in two weeks secondary to high out of pocket cost  Thank you for the referral      Impairments: abnormal muscle firing, abnormal or restricted ROM, abnormal movement, activity intolerance, impaired physical strength and pain with function     Prognosis: good    Goals  Impairment Goals: 4-6 weeks  - Patient to decrease pain to 4/10  - Patient to increase hip strength to 4+/5 throughout  - Patient to complete single leg squat without valgus collapse    Functional Goals: by discharge  - Patient to discharge to independent [facility]  - Patient to return to prior level of function  - Patient to ascend and descend stairs without increased pain or difficulty  - Patient to complete work activities with reduced pain      Plan  Plan details: Plan one visit in two weeks to update HEP and monitor progress  Patient would benefit from: skilled physical therapy  Planned modality interventions: cryotherapy, TENS and thermotherapy: hydrocollator packs  Planned therapy interventions: flexibility, home exercise program, joint mobilization, manual therapy, neuromuscular re-education, patient education, strengthening, stretching, therapeutic activities, therapeutic exercise and functional ROM exercises  Duration in visits: 4  Treatment plan discussed with: patient        Subjective Evaluation    History of Present Illness  Mechanism of injury: HISTORY OF PRESENT ILLNESS: Patient notes chronic history of bilateral knee pain with no SHERRY since she was younger  Pain has worsened over the years  Patient moved and began working 4 years ago and pain has worsened  Pain is localized to quadriceps tendon and medial joint line  Pain is worse on the L compared to R  Patient feels as though her knee may give out at times  No sensation of locking or gross instability  Patient has history of low back pain with no acute changes  Patient has reduced her shifts to 8 hours from 10 hours    PRIOR TREATMENT: Celebrex  AGGRAVATING FACTORS: walking long durations, squatting, deep knee flexion, stair navigation  EASING FACTORS: heat  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, walking)  IMAGING: x-rays unremarkable  FUNCTIONAL LIMITATIONS: walking longer durations, squatting, stair navigation, difficulty working, fitness activities including squats and lunges  SUBJECTIVE FUNCTIONAL LEVEL: 60%  PATIENT GOAL: to heal and get rid of the pain    Pain  Current pain ratin  At best pain ratin  At worst pain ratin  Location: L knee          Objective     Palpation     Additional Palpation Details  No TTP elicited    Active Range of Motion   Left Knee   Normal active range of motion    Right Knee   Normal active range of motion    Passive Range of Motion   Left Knee   Normal passive range of motion    Right Knee   Normal passive range of motion    Strength/Myotome Testing     Left Hip   Planes of Motion   Flexion: 4+  Extension: 3+  Abduction: 3+  External rotation: 4-  Internal rotation: 4    Right Hip   Planes of Motion   Flexion: 4+  Extension: 4  Abduction: 3+  External rotation: 4-  Internal rotation: 4+    Left Knee   Flexion: 4+  Extension: 4    Right Knee   Flexion: 4+  Extension: 4    Left Ankle/Foot   Dorsiflexion: 5    Right Ankle/Foot   Dorsiflexion: 5    Tests     Left Knee   Positive medial Celestine  Negative anterior Lachman, lateral Celestine, Thessaly's test at 20 degrees, valgus stress test at 30 degrees and varus stress test at 30 degrees  Right Knee   Negative anterior Lachman, lateral Celestine, medial Celestine, Thessaly's test at 20 degrees, valgus stress test at 30 degrees and varus stress test at 30 degrees  Functional Assessment      Squat    Left within functional limits and right within functional limits  Single Leg Squat   Left Leg  Valgus  Right Leg  Within functional limits       Single Leg Stance   Left: 30 seconds  Right: 30 seconds      Flowsheet Rows      Most Recent Value   PT/OT G-Codes    Current Score 38   Projected Score 66             Diagnosis: B/L knee pain   Precautions: none   Primary impairments: hip abduction and extension strength deficits, poor neuromuscular control of knee   *asterisks by exercise = given for HEP    4/15       Manuals        Patellar taping                                        There Ex        Treadmill                                                                        Neuro Re-Ed        U/L squat to chair *  x 5 B       Sidestepping *  green x 2 laps       Monster walks        X band walks        U/L bridge *  x 10 B       U/L leg press        U/L deadlift        Lat test the collazo        SLS on foam                                Re-evaluation             Ther Act/Gait                                         Modalities

## 2022-04-18 ENCOUNTER — TELEPHONE (OUTPATIENT)
Dept: OBGYN CLINIC | Facility: CLINIC | Age: 22
End: 2022-04-18

## 2022-04-18 NOTE — TELEPHONE ENCOUNTER
Frank Stearns  #: [phone]       Patient called into the office to check status of [address] [facility] [address] paperwork, per log it is still being processed  Advised patient of 10-14 business day turn around  She understood and would like a call back once completed

## 2022-04-19 ENCOUNTER — TELEPHONE (OUTPATIENT)
Dept: OBGYN CLINIC | Facility: HOSPITAL | Age: 22
End: 2022-04-19

## 2022-04-19 NOTE — TELEPHONE ENCOUNTER
Patient called, on her paper work for [address] [facility] [address] they are requesting a reason why for no picking  This job requires a lot of walking  This can be added to the form that was already completed  Please call patient when completed      C/B # [phone]

## 2022-04-25 NOTE — TELEPHONE ENCOUNTER
Patient called back into the office, she states her job is now requesting that it be added that she needs a stationary job which would not involve her walking all day  She is asking if this can be updated          Please call back @: [phone]

## 2022-04-29 ENCOUNTER — APPOINTMENT (OUTPATIENT)
Dept: PHYSICAL THERAPY | Facility: REHABILITATION | Age: 22
End: 2022-04-29

## 2022-04-29 NOTE — TELEPHONE ENCOUNTER
Patient is calling back stating her employer needs MORE detailed physical restrictions  They need to know exactly why she cannot pick, which is because "picking" requires walking constantly during her shift  Sharri Garcia states they are asking for an amount of time that she is allowed to walk at at time or how much walking is permitted per 8 hour shift  Patient states one hour per shift is all she is able to walk at this time  Sharri Garcia is asking that our office update the form that was sent to [place] and fax it back to [place]  Please advise      Sharri Garcia [phone] (patient would like copies of these forms, please advise)

## 2022-05-10 ENCOUNTER — TELEPHONE (OUTPATIENT)
Dept: OBGYN CLINIC | Facility: OTHER | Age: 22
End: 2022-05-10

## 2022-05-10 NOTE — TELEPHONE ENCOUNTER
Patient called in , her work is not approving the "No more walking than 1 hour"    They need it to say "with max of 4 hour walking per shift"    She will get letter off [employer] if/when completed      C/b [phone]

## 2022-05-10 NOTE — TELEPHONE ENCOUNTER
Patient is calling back stating she spoke with her employer and is requesting the note to be updated saying she is able to walk 2-3 hours a day if the job she is doing requires it and after that timeframe she can do a sedentary job

## 2022-05-24 NOTE — TELEPHONE ENCOUNTER
Patient called in to advise the put her on leave and cannot accommodate her work restrictions  She would like a note that she is able to walk for more than four hours but will need a break as needed  Patient would like a call at [phone] so she can specify what is needed

## 2022-05-25 NOTE — TELEPHONE ENCOUNTER
Called pt for more information about work note restrictions  Pt needs note to allow her to walk up to 4 hours, with breaks as needed  After 4 hours she can do stationary  Pt states that her job informed her they do not have any sedentary work, but she can be put into stationary if work note allows

## 2022-09-27 NOTE — LETTER
April 8, 2022     Patient: Sharon Mccormick   YOB: 2000   Date of Visit: 4/8/2022       To Whom it May Concern:    Sharon Mccormick is under my professional care  She was seen in my office on 4/8/2022  Please allow Marzella Kawasaki to work for 8 hours a day 5 times a week, and requesting to make this permanent  If you have any questions or concerns, please don't hesitate to call           Sincerely,          Keturah Goff MD        CC: No Recipients Spoke with PT, she stated she did not want to wait 6 months for our first available to get Vein Mapping done. May need to be ordered externally.

## 2023-10-18 PROBLEM — R10.84 GENERALIZED ABDOMINAL PAIN: Status: ACTIVE | Noted: 2023-10-18

## 2023-11-25 ENCOUNTER — NURSE TRIAGE (OUTPATIENT)
Dept: OTHER | Facility: OTHER | Age: 23
End: 2023-11-25

## 2023-11-25 NOTE — TELEPHONE ENCOUNTER
Reason for Disposition  • [1] Follow-up call to recent contact AND [2] information only call, no triage required    Answer Assessment - Initial Assessment Questions  1. REASON FOR CALL or QUESTION: "What is your reason for calling today?" or "How can I best help you?" or "What question do you have that I can help answer?"      Patient is in the [facility] and was provided with phone numbers of her PCP and OB/GYN as well as advised that she can been seen at any UC or ED of her preference if she needs to be evaluated right away. Protocols used:  Information Only Call - No Triage-[facility]

## 2023-11-25 NOTE — TELEPHONE ENCOUNTER
Regarding: burning/ itching  ----- Message from Rudy Reyes sent at 11/25/2023 12:50 PM EST -----  "I am having a vaginal burning and itching sensation on the inside."

## 2024-02-04 ENCOUNTER — HOSPITAL ENCOUNTER (EMERGENCY)
Facility: HOSPITAL | Age: 24
Discharge: HOME | End: 2024-02-05
Attending: EMERGENCY MEDICINE
Payer: COMMERCIAL

## 2024-02-04 VITALS
RESPIRATION RATE: 18 BRPM | OXYGEN SATURATION: 99 % | DIASTOLIC BLOOD PRESSURE: 65 MMHG | SYSTOLIC BLOOD PRESSURE: 141 MMHG | TEMPERATURE: 97.9 F | HEART RATE: 84 BPM

## 2024-02-04 DIAGNOSIS — R10.9 ABDOMINAL PAIN: ICD-10-CM

## 2024-02-04 DIAGNOSIS — K52.9 ENTERITIS: Primary | ICD-10-CM

## 2024-02-04 LAB
ANION GAP SERPL CALCULATED.3IONS-SCNC: 9 MMOL/L
BACTERIA UR QL AUTO: ABNORMAL /HPF
BASOPHILS # BLD AUTO: 0.03 THOUSANDS/ÂΜL (ref 0–0.1)
BASOPHILS NFR BLD AUTO: 1 % (ref 0–1)
BILIRUB UR QL STRIP: NEGATIVE
BUN SERPL-MCNC: 13 MG/DL (ref 5–25)
CALCIUM SERPL-MCNC: 8.8 MG/DL (ref 8.4–10.2)
CHLORIDE SERPL-SCNC: 105 MMOL/L (ref 96–108)
CLARITY UR: CLEAR
CLARITY, POC: CLEAR
CO2 SERPL-SCNC: 23 MMOL/L (ref 21–32)
COLOR UR: YELLOW
COLOR, POC: YELLOW
CREAT SERPL-MCNC: 0.75 MG/DL (ref 0.6–1.3)
EOSINOPHIL # BLD AUTO: 0.14 THOUSAND/ÂΜL (ref 0–0.61)
EOSINOPHIL NFR BLD AUTO: 2 % (ref 0–6)
ERYTHROCYTE [DISTWIDTH] IN BLOOD BY AUTOMATED COUNT: 13.6 % (ref 11.6–15.1)
EXT PREGNANCY TEST URINE: NEGATIVE
EXT. CONTROL: NORMAL
GFR SERPL CREATININE-BSD FRML MDRD: 111 ML/MIN/1.73SQ M
GLUCOSE SERPL-MCNC: 95 MG/DL (ref 65–140)
GLUCOSE UR STRIP-MCNC: NEGATIVE MG/DL
HCT VFR BLD AUTO: 37.3 % (ref 34.8–46.1)
HGB BLD-MCNC: 12 G/DL (ref 11.5–15.4)
HGB UR QL STRIP.AUTO: NEGATIVE
IMM GRANULOCYTES # BLD AUTO: 0.01 THOUSAND/UL (ref 0–0.2)
IMM GRANULOCYTES NFR BLD AUTO: 0 % (ref 0–2)
KETONES UR STRIP-MCNC: NEGATIVE MG/DL
LEUKOCYTE ESTERASE UR QL STRIP: NEGATIVE
LYMPHOCYTES # BLD AUTO: 3 THOUSANDS/ÂΜL (ref 0.6–4.47)
LYMPHOCYTES NFR BLD AUTO: 45 % (ref 14–44)
MCH RBC QN AUTO: 28.3 PG (ref 26.8–34.3)
MCHC RBC AUTO-ENTMCNC: 32.2 G/DL (ref 31.4–37.4)
MCV RBC AUTO: 88 FL (ref 82–98)
MONOCYTES # BLD AUTO: 0.76 THOUSAND/ÂΜL (ref 0.17–1.22)
MONOCYTES NFR BLD AUTO: 11 % (ref 4–12)
MUCOUS THREADS UR QL AUTO: ABNORMAL
NEUTROPHILS # BLD AUTO: 2.7 THOUSANDS/ÂΜL (ref 1.85–7.62)
NEUTS SEG NFR BLD AUTO: 41 % (ref 43–75)
NITRITE UR QL STRIP: NEGATIVE
NON-SQ EPI CELLS URNS QL MICRO: ABNORMAL /HPF
NRBC BLD AUTO-RTO: 0 /100 WBCS
PH UR STRIP.AUTO: 6 [PH] (ref 4.5–8)
PLATELET # BLD AUTO: 266 THOUSANDS/UL (ref 149–390)
PMV BLD AUTO: 9.3 FL (ref 8.9–12.7)
POTASSIUM SERPL-SCNC: 3.7 MMOL/L (ref 3.5–5.3)
PROT UR STRIP-MCNC: ABNORMAL MG/DL
RBC # BLD AUTO: 4.24 MILLION/UL (ref 3.81–5.12)
RBC #/AREA URNS AUTO: ABNORMAL /HPF
SODIUM SERPL-SCNC: 137 MMOL/L (ref 135–147)
SP GR UR STRIP.AUTO: >=1.03 (ref 1–1.03)
UROBILINOGEN UR QL STRIP.AUTO: 0.2 E.U./DL
WBC # BLD AUTO: 6.64 THOUSAND/UL (ref 4.31–10.16)
WBC #/AREA URNS AUTO: ABNORMAL /HPF

## 2024-02-04 PROCEDURE — 99285 EMERGENCY DEPT VISIT HI MDM: CPT | Performed by: EMERGENCY MEDICINE

## 2024-02-04 PROCEDURE — 81025 URINE PREGNANCY TEST: CPT

## 2024-02-04 PROCEDURE — 36415 COLL VENOUS BLD VENIPUNCTURE: CPT

## 2024-02-04 PROCEDURE — 80048 BASIC METABOLIC PNL TOTAL CA: CPT

## 2024-02-04 PROCEDURE — 99284 EMERGENCY DEPT VISIT MOD MDM: CPT

## 2024-02-04 PROCEDURE — 81001 URINALYSIS AUTO W/SCOPE: CPT

## 2024-02-04 PROCEDURE — 85025 COMPLETE CBC W/AUTO DIFF WBC: CPT

## 2024-02-04 NOTE — Clinical Note
Ling Cox was seen and treated in our emergency department on 2/4/2024.                Diagnosis: Abdominal pain    Ling  may return to work on return date.    She may return on this date: 02/06/2024         If you have any questions or concerns, please don't hesitate to call.      Moshe Koehler, DO    ______________________________           _______________          _______________  Hospital Representative                              Date                                Time

## 2024-02-04 NOTE — Clinical Note
Bill Antonio accompanied Ling Cox to the emergency department on 2/4/2024.    Return date if applicable: 02/06/2024        If you have any questions or concerns, please don't hesitate to call.      Moshe Koehler, DO

## 2024-02-04 NOTE — Clinical Note
accompanied Ling Cox to the emergency department on 2/4/2024.    Return date if applicable: 02/06/2024        If you have any questions or concerns, please don't hesitate to call.      Moshe Koehler, DO

## 2024-02-05 ENCOUNTER — APPOINTMENT (EMERGENCY)
Dept: RADIOLOGY | Facility: HOSPITAL | Age: 24
End: 2024-02-05
Payer: COMMERCIAL

## 2024-02-05 ENCOUNTER — TELEPHONE (OUTPATIENT)
Dept: NEPHROLOGY | Facility: CLINIC | Age: 24
End: 2024-02-05

## 2024-02-05 PROCEDURE — G1004 CDSM NDSC: HCPCS

## 2024-02-05 PROCEDURE — 74177 CT ABD & PELVIS W/CONTRAST: CPT

## 2024-02-05 RX ADMIN — IOHEXOL 85 ML: 350 INJECTION, SOLUTION INTRAVENOUS at 00:23

## 2024-02-05 NOTE — ED PROVIDER NOTES
History  Chief Complaint   Patient presents with    Abdominal Pain     PT c/o LLQ abd pain that started about 6pm tonight. -N/V. No help from karly/motrin      23-year-old female with no significant past medical history who presents complaining of left lower quadrant abdominal pain that began suddenly around 6 PM this evening.  The patient states that the pain is sharp and does not radiate.  The patient took ibuprofen without relief of her symptoms which prompted her to present to the emergency department.  Since arriving in the ED the patient states that her pain has improved somewhat.  The patient reports that her last menstrual period was sometime around December 20th.  The patient states that she usually has regular periods.  Patient reports that she had a small amount of vaginal bleeding yesterday but none today.  The patient states that she is not currently on any birth control.  The patient denies any vaginal discharge.  The patient reports a history of appendectomy and umbilical hernia repair.  The patient denies fever, chills, nausea, vomiting, diarrhea, dysuria, hematuria.        Prior to Admission Medications   Prescriptions Last Dose Informant Patient Reported? Taking?   Junel FE 1/20 1-20 MG-MCG per tablet   Yes No   Patient not taking: Reported on 10/18/2023   Becca 0.25-35 MG-MCG per tablet   Yes No   Sig: Take 1 tablet by mouth daily   b complex-C-E-zinc (STRESS FORMULA/ZINC) tablet   No No   Sig: Take 1 tablet by mouth daily   Patient not taking: Reported on 10/18/2023   celecoxib (CeleBREX) 200 mg capsule   No No   Sig: Take 1 capsule (200 mg total) by mouth daily With food/breakfast   Patient not taking: Reported on 10/18/2023   dicyclomine (BENTYL) 10 mg capsule   No No   Sig: Take 1 capsule (10 mg total) by mouth 3 (three) times a day as needed (abd cramping urgency)   ergocalciferol (VITAMIN D2) 50,000 units   No No   Sig: Take 1 capsule (50,000 Units total) by mouth once a week   Patient not  taking: Reported on 10/18/2023   omeprazole (PriLOSEC) 20 mg delayed release capsule   Yes No   Sig: TAKE 1 CAPSULE BY MOUTH EVERY DAY 30 MINUTES TO 1 HOUR BEFORE A MEAL   ondansetron (ZOFRAN) 4 mg tablet   No No   Sig: Take 1 tablet (4 mg total) by mouth every 8 (eight) hours as needed for nausea or vomiting      Facility-Administered Medications: None       History reviewed. No pertinent past medical history.    Past Surgical History:   Procedure Laterality Date    APPENDECTOMY         Family History   Problem Relation Age of Onset    Cancer Maternal Grandfather     Lung cancer Paternal Grandfather      I have reviewed and agree with the history as documented.    E-Cigarette/Vaping    E-Cigarette Use Never User      E-Cigarette/Vaping Substances    Nicotine No     THC No     CBD No     Flavoring No     Other No     Unknown No      Social History     Tobacco Use    Smoking status: Never    Smokeless tobacco: Never   Vaping Use    Vaping status: Never Used   Substance Use Topics    Alcohol use: Not Currently    Drug use: Not Currently        Review of Systems   Constitutional:  Negative for chills, diaphoresis and fever.   HENT:  Negative for congestion and sore throat.    Eyes:  Negative for pain and redness.   Respiratory:  Negative for cough and shortness of breath.    Cardiovascular:  Negative for chest pain, palpitations and leg swelling.   Gastrointestinal:  Positive for abdominal pain. Negative for diarrhea, nausea and vomiting.   Genitourinary:  Positive for vaginal bleeding. Negative for dysuria, flank pain, hematuria and vaginal discharge.   Musculoskeletal:  Negative for arthralgias and myalgias.   Skin:  Negative for color change, pallor and rash.   Neurological:  Negative for dizziness, syncope, weakness, light-headedness, numbness and headaches.   All other systems reviewed and are negative.      Physical Exam  ED Triage Vitals [02/04/24 2105]   Temperature Pulse Respirations Blood Pressure SpO2   97.9  °F (36.6 °C) 84 18 141/65 99 %      Temp Source Heart Rate Source Patient Position - Orthostatic VS BP Location FiO2 (%)   Oral Monitor -- -- --      Pain Score       8             Orthostatic Vital Signs  Vitals:    02/04/24 2105   BP: 141/65   Pulse: 84       Physical Exam  Vitals and nursing note reviewed.   Constitutional:       General: She is not in acute distress.     Appearance: Normal appearance. She is not ill-appearing, toxic-appearing or diaphoretic.   HENT:      Head: Normocephalic and atraumatic.      Nose: Nose normal. No congestion or rhinorrhea.      Mouth/Throat:      Mouth: Mucous membranes are moist.      Pharynx: Oropharynx is clear.   Eyes:      General: No scleral icterus.     Extraocular Movements: Extraocular movements intact.      Conjunctiva/sclera: Conjunctivae normal.      Pupils: Pupils are equal, round, and reactive to light.   Cardiovascular:      Rate and Rhythm: Normal rate and regular rhythm.      Pulses: Normal pulses.      Heart sounds: Normal heart sounds. No murmur heard.     No friction rub. No gallop.   Pulmonary:      Effort: Pulmonary effort is normal.      Breath sounds: Normal breath sounds. No wheezing, rhonchi or rales.   Abdominal:      General: Abdomen is flat.      Palpations: Abdomen is soft.      Tenderness: There is no abdominal tenderness. There is no right CVA tenderness, left CVA tenderness, guarding or rebound.   Musculoskeletal:         General: No swelling, tenderness, deformity or signs of injury. Normal range of motion.      Cervical back: Normal range of motion and neck supple. No rigidity or tenderness.      Right lower leg: No edema.      Left lower leg: No edema.   Lymphadenopathy:      Cervical: No cervical adenopathy.   Skin:     General: Skin is warm and dry.      Capillary Refill: Capillary refill takes less than 2 seconds.      Coloration: Skin is not jaundiced or pale.      Findings: No bruising, erythema, lesion or rash.   Neurological:       General: No focal deficit present.      Mental Status: She is alert and oriented to person, place, and time.         ED Medications  Medications   iohexol (OMNIPAQUE) 350 MG/ML injection (MULTI-DOSE) 85 mL (85 mL Intravenous Given 2/5/24 0023)       Diagnostic Studies  Results Reviewed       Procedure Component Value Units Date/Time    Basic metabolic panel [[id number]] Collected: 02/04/24 2314    Lab Status: Final result Specimen: Blood from Arm, Left Updated: 02/04/24 2342     Sodium 137 mmol/L      Potassium 3.7 mmol/L      Chloride 105 mmol/L      CO2 23 mmol/L      ANION GAP 9 mmol/L      BUN 13 mg/dL      Creatinine 0.75 mg/dL      Glucose 95 mg/dL      Calcium 8.8 mg/dL      eGFR 111 ml/min/1.73sq m     Narrative:      National Kidney Disease Foundation guidelines for Chronic Kidney Disease (CKD):     Stage 1 with normal or high GFR (GFR > 90 mL/min/1.73 square meters)    Stage 2 Mild CKD (GFR = 60-89 mL/min/1.73 square meters)    Stage 3A Moderate CKD (GFR = 45-59 mL/min/1.73 square meters)    Stage 3B Moderate CKD (GFR = 30-44 mL/min/1.73 square meters)    Stage 4 Severe CKD (GFR = 15-29 mL/min/1.73 square meters)    Stage 5 End Stage CKD (GFR <15 mL/min/1.73 square meters)  Note: GFR calculation is accurate only with a steady state creatinine    CBC and differential [[id number]]  (Abnormal) Collected: 02/04/24 2314    Lab Status: Final result Specimen: Blood from Arm, Left Updated: 02/04/24 2321     WBC 6.64 Thousand/uL      RBC 4.24 Million/uL      Hemoglobin 12.0 g/dL      Hematocrit 37.3 %      MCV 88 fL      MCH 28.3 pg      MCHC 32.2 g/dL      RDW 13.6 %      MPV 9.3 fL      Platelets 266 Thousands/uL      nRBC 0 /100 WBCs      Neutrophils Relative 41 %      Immat GRANS % 0 %      Lymphocytes Relative 45 %      Monocytes Relative 11 %      Eosinophils Relative 2 %      Basophils Relative 1 %      Neutrophils Absolute 2.70 Thousands/µL      Immature Grans Absolute 0.01 Thousand/uL      Lymphocytes  Absolute 3.00 Thousands/µL      Monocytes Absolute 0.76 Thousand/µL      Eosinophils Absolute 0.14 Thousand/µL      Basophils Absolute 0.03 Thousands/µL     Urine Microscopic [[id number]]  (Abnormal) Collected: 02/04/24 2244    Lab Status: Final result Specimen: Urine, Clean Catch Updated: 02/04/24 2302     RBC, UA None Seen /hpf      WBC, UA 2-4 /hpf      Epithelial Cells Occasional /hpf      Bacteria, UA None Seen /hpf      MUCUS THREADS Innumerable    POCT pregnancy, urine [[id number]]  (Normal) Resulted: 02/04/24 2246    Lab Status: Final result Updated: 02/04/24 2246     EXT Preg Test, Ur Negative     Control Valid    POCT urinalysis dipstick [[id number]]  (Normal) Resulted: 02/04/24 2246    Lab Status: Final result Specimen: Urine Updated: 02/04/24 2246     Color, UA Yellow     Clarity, UA Clear     EXT Leukocytes, UA --     Nitrite, UA --     Protein, UA -- mg/dl      Glucose, UA --     Ketones, UA -- mg/dl      EXT Urobilinogen, UA --      Bilirubin, UA --     Blood, UA --    Urine Macroscopic, POC [[id number]]  (Abnormal) Collected: 02/04/24 2244    Lab Status: Final result Specimen: Urine Updated: 02/04/24 2245     Color, UA Yellow     Clarity, UA Clear     pH, UA 6.0     Leukocytes, UA Negative     Nitrite, UA Negative     Protein,  (2+) mg/dl      Glucose, UA Negative mg/dl      Ketones, UA Negative mg/dl      Urobilinogen, UA 0.2 E.U./dl      Bilirubin, UA Negative     Occult Blood, UA Negative     Specific Gravity, UA >=1.030    Narrative:      CL[facility] RESULT                   CT abdomen pelvis with contrast   Final Result by Jigar Vasquez MD (02/05 0134)      Findings suggesting mild enteritis         Workstation performed: [id number]               Procedures  Procedures      ED Course                             SBIRT 20yo+      Flowsheet Row Most Recent Value   Initial Alcohol Screen: US AUDIT-C     1. How often do you have a drink containing alcohol? 0 Filed at: 02/04/2024 2105   2. How many  drinks containing alcohol do you have on a typical day you are drinking?  0 Filed at: 02/04/2024 2107   3a. Male UNDER 65: How often do you have five or more drinks on one occasion? 0 Filed at: 02/04/2024 2107   3b. FEMALE Any Age, or MALE 65+: How often do you have 4 or more drinks on one occassion? 0 Filed at: 02/04/2024 2107   Audit-C Score 0 Filed at: 02/04/2024 2107   ANTIONE: How many times in the past year have you...    Used an illegal drug or used a prescription medication for non-medical reasons? Never Filed at: 02/04/2024 2107                  Medical Decision Making  23-year-old female with no significant past medical history who presents complaining of sudden onset, sharp left lower quadrant abdominal pain that began around 6 PM this evening.  Vitals are within the normal limits.  On exam the patient is alert and oriented, no acute distress, mucous membranes moist, heart is regular rate and rhythm, lungs are clear to auscultation bilaterally, abdomen is soft and nontender, no CVA tenderness.  Differential diagnosis includes ovarian cyst, ovarian torsion, complication of pregnancy, functional abdominal pain.  Will order urine hCG, UA, CBC, BMP, and CT of the abdomen and pelvis with IV contrast.    hCG is negative.  Lab work is reviewed and without actionable derangements.  CT of the abdomen and pelvis shows findings consistent with mild enteritis but no other pathology.  The patient states that her pain has resolved.  Patient is instructed to follow-up with her primary care provider.  Return precautions are given and the patient is discharged.    Amount and/or Complexity of Data Reviewed  Labs: ordered.  Radiology: ordered.    Risk  Prescription drug management.          Disposition  Final diagnoses:   Abdominal pain   Enteritis     Time reflects when diagnosis was documented in both MDM as applicable and the Disposition within this note       Time User Action Codes Description Comment    2/5/2024  1:37 AM  Check, Ming Add [R10.9] Abdominal pain     2/5/2024  1:37 AM Check, Ming Add [K52.9] Enteritis     2/5/2024  1:37 AM Check, Ming Modify [R10.9] Abdominal pain     2/5/2024  1:37 AM Check, Ming Modify [K52.9] Enteritis           ED Disposition       ED Disposition   Discharge    Condition   Stable    Date/Time   Mon Feb 5, 2024 0137    Comment   Ling Miguelmendez Cox discharge to home/self care.                   Follow-up Information       Follow up With Specialties Details Why Contact Info Additional Information    [facility] Emergency Department Emergency Medicine Go to  If symptoms worsen [address] [place] [id number] [phone] [facility] Emergency Department, [address] [place] [address], [address] [phone]    Tanner Hong MD Family Medicine Schedule an appointment as soon as possible for a visit  As needed [address] [place] [address]  [phone]               Discharge Medication List as of 2/5/2024  1:43 AM        CONTINUE these medications which have NOT CHANGED    Details   b complex-C-E-zinc (STRESS FORMULA/ZINC) tablet Take 1 tablet by mouth daily, Starting Tue 3/29/2022, Normal      celecoxib (CeleBREX) 200 mg capsule Take 1 capsule (200 mg total) by mouth daily With food/breakfast, Starting Tue 3/29/2022, Normal      dicyclomine (BENTYL) 10 mg capsule Take 1 capsule (10 mg total) by mouth 3 (three) times a day as needed (abd cramping urgency), Starting Wed 10/18/2023, Normal      ergocalciferol (VITAMIN D2) 50,000 units Take 1 capsule (50,000 Units total) by mouth once a week, Starting Tue 3/29/2022, Normal      Junel FE 1/20 1-20 MG-MCG per tablet Starting Mon 3/28/2022, Historical Med      Becca 0.25-35 MG-MCG per tablet Take 1 tablet by mouth daily, Historical Med      omeprazole (PriLOSEC) 20 mg delayed release capsule TAKE 1 CAPSULE BY MOUTH EVERY DAY 30 MINUTES TO 1 HOUR BEFORE A MEAL,  Historical Med      ondansetron (ZOFRAN) 4 mg tablet Take 1 tablet (4 mg total) by mouth every 8 (eight) hours as needed for nausea or vomiting, Starting Wed 10/18/2023, Normal           No discharge procedures on file.    PDMP Review       None             ED Provider  Attending physically available and evaluated Ling Cox. I managed the patient along with the ED Attending.    Electronically Signed by           Moshe Koehler DO  02/05/24 0152

## 2024-02-05 NOTE — ED ATTENDING ATTESTATION
2/4/2024  I, Ming Epps MD, saw and evaluated the patient. I have discussed the patient with the resident/non-physician practitioner and agree with the resident's/non-physician practitioner's findings, Plan of Care, and MDM as documented in the resident's/non-physician practitioner's note, except where noted. All available labs and Radiology studies were reviewed.  I was present for key portions of any procedure(s) performed by the resident/non-physician practitioner and I was immediately available to provide assistance.       At this point I agree with the current assessment done in the Emergency Department.  I have conducted an independent evaluation of this patient a history and physical is as follows:    23-year-old female presents to the emergency department for evaluation of sudden onset sharp left lower quadrant abdominal pain that started around 6 PM.  Has been constant since.  No radiation.  No obvious worsening or alleviating factors.  No associated nausea, vomiting, constipation or diarrhea.  No dysuria or hematuria.  Does have a history of umbilical hernia repair as well as prior appendectomy.  Last menstrual period was at the end of December.  She is not currently on any OCPs.  No fevers or chills.  No chest pain or shortness of breath.    On exam, patient was comfortably bed in no acute distress, head is normocephalic atraumatic, pupils equal and reactive to light, neck is supple without meningismus signs, heart is regular rate and rhythm with intact distal pulses, no increased work of breathing, respiratory distress, or stridor.  Abdomen is soft, nontender nondistended without rebound or guarding.  No CVA tenderness.    Differential diagnosis includes but is not limited to pregnancy, urinary tract infection, ureterolithiasis, diverticulitis, ovarian cyst.  Plan to check basic labs, urinalysis, urine pregnancy test.  If pregnancy test is negative, will proceed with CT scan of the abdomen and  pelvis as we are unable to get an ultrasound at this time.    Urinalysis negative for signs of infection.  Pregnancy test was negative.  Labs grossly unremarkable.  CT scan shows enteritis.  Patient stable for discharge.    ED Course  ED Course as of 02/05/24 0044   Sun Feb 04, 2024   2252 PREGNANCY TEST URINE: Negative   2252 Leukocytes, UA: Negative   2252 Nitrite, UA: Negative   2304 Bacteria, UA: None Seen         Critical Care Time  Procedures

## 2024-02-05 NOTE — DISCHARGE INSTRUCTIONS
You were seen in the emergency department for left lower abdominal pain.  Your CT scan showed mild enteritis but no other concerning findings.  Take medications such as ibuprofen and Tylenol if you continue to have pain.  Follow-up with your primary care provider.  If you have worsening symptoms or any new concerning symptoms please return to the emergency department.

## 2024-02-05 NOTE — TELEPHONE ENCOUNTER
New Patient Intake Form   Patient Details   Ling Cox     2000     [id number]     Insurance Information   Name of Insurance Company [employer]    Does the patient need an insurance referral? yes   If patient has VA insurance, please ask if they will be using their VA insurance.   Appointment Information   Who is calling to schedule?  If not patient, what is callers name? Patient   Referring Provider  self     Reason for Appt (Diagnosis) Proteinuria    Does Patient have labs/urine done at [facility]?  If not, where do they go?  List the date of last lab / urine  *Please try to get labs 2 years back if not at  yes   Has patient been hospitalized recently?  If yes, list name and location of hospital they were in yes   Has patient been seen by a Nephrologist before?  If yes, list name, location and phone number no   Has the patient had renal imaging done?  If so, list the most recent date and type of imaging no    Does patient have a history of Kidney Stones? no   Appointment Details   Is there a referral on file? no    Appointment Date 3/28/24     [facility]  [place]   Miscellaneous

## 2024-06-19 ENCOUNTER — NURSE TRIAGE (OUTPATIENT)
Age: 24
End: 2024-06-19

## 2024-06-19 NOTE — TELEPHONE ENCOUNTER
"Regarding: UTI/Yeast infection like symptoms  ----- Message from Miguelina TODD sent at 6/19/2024  3:24 PM EDT -----  NP called reporting vaginal itching, swelling, and discharge w/ odor (\"rubber smell\"). Pt also reports burning/discomfort with urination. Freq urination at times. Pt reports symptoms for about 3 weeks.    "

## 2024-06-19 NOTE — TELEPHONE ENCOUNTER
"NP called reporting vaginal itching, swelling, and discharge w/ odor (\"rubber smell\"). Pt also reports burning/discomfort with urination. Freq urination at times. Pt reports symptoms for about 3 weeks.   Urgent new patient appointment scheduled for 6/20.  Reviewed home care and patient advised to monitor symptoms and call back any changes.    Reason for Disposition   Bad smelling vaginal discharge    Answer Assessment - Initial Assessment Questions  1. DISCHARGE: \"Describe the discharge.\" (e.g., white, yellow, green, gray, foamy, cottage cheese-like)      White and thick, slimy consistency,using mini pad  2. ODOR: \"Is there a bad odor?\"      Bad odor, \"smells like rubber\"  3. ONSET: \"When did the discharge begin?\"      On and off started 3 weeks ago  4. RASH: \"Is there a rash in that area?\" If Yes, ask: \"Describe it.\" (e.g., redness, blisters, sores, bumps)      Very red and swollen  5. ABDOMINAL PAIN: \"Are you having any abdominal pain?\" If Yes, ask: \"What does it feel like? \" (e.g., crampy, dull, intermittent, constant)       Denies   6. ABDOMINAL PAIN SEVERITY: If present, ask: \"How bad is it?\"  (e.g., mild, moderate, severe)   - MILD - doesn't interfere with normal activities    - MODERATE - interferes with normal activities or awakens from sleep    - SEVERE - patient doesn't want to move (R/O peritonitis)       denies  7. CAUSE: \"What do you think is causing the discharge?\" \"Have you had the same problem before? What happened then?\"      Gets discharge often, 1 x in past was treated for yeast infection  8. OTHER SYMPTOMS: \"Do you have any other symptoms?\" (e.g., fever, itching, vaginal bleeding, pain with urination, injury to genital area, vaginal foreign body)      Denies fever, pain with urination, urgency with urination. Itching all over- back, abdomen and internal vagina 10/10   9. PREGNANCY: \"Is there any chance you are pregnant?\" \"When was your last menstrual period?\"      LMP approx 5/30/24, does not " think she is pregnant. Stopped birth control pills using condoms    Protocols used: Vaginal Discharge-ADULT-[place]

## 2024-06-20 ENCOUNTER — OFFICE VISIT (OUTPATIENT)
Dept: OBGYN CLINIC | Facility: CLINIC | Age: 24
End: 2024-06-20
Payer: COMMERCIAL

## 2024-06-20 VITALS
DIASTOLIC BLOOD PRESSURE: 74 MMHG | BODY MASS INDEX: 18.69 KG/M2 | HEIGHT: 61 IN | WEIGHT: 99 LBS | SYSTOLIC BLOOD PRESSURE: 102 MMHG

## 2024-06-20 DIAGNOSIS — Z11.3 SCREENING EXAMINATION FOR STD (SEXUALLY TRANSMITTED DISEASE): ICD-10-CM

## 2024-06-20 DIAGNOSIS — R39.9 UTI SYMPTOMS: ICD-10-CM

## 2024-06-20 DIAGNOSIS — R82.90 ABNORMAL URINALYSIS: Chronic | ICD-10-CM

## 2024-06-20 DIAGNOSIS — N76.0 VULVOVAGINITIS: ICD-10-CM

## 2024-06-20 DIAGNOSIS — R30.0 DYSURIA: ICD-10-CM

## 2024-06-20 DIAGNOSIS — B37.31 CANDIDA VAGINITIS: Primary | ICD-10-CM

## 2024-06-20 LAB
BV WHIFF TEST VAG QL: NEGATIVE
CLUE CELLS SPEC QL WET PREP: NEGATIVE
PH SMN: NORMAL [PH]
SL AMB  POCT GLUCOSE, UA: NORMAL
SL AMB LEUKOCYTE ESTERASE,UA: NORMAL
SL AMB POCT BILIRUBIN,UA: NORMAL
SL AMB POCT BLOOD,UA: NORMAL
SL AMB POCT CLARITY,UA: NORMAL
SL AMB POCT COLOR,UA: NORMAL
SL AMB POCT KETONES,UA: NORMAL
SL AMB POCT NITRITE,UA: NORMAL
SL AMB POCT PH,UA: 6
SL AMB POCT SPECIFIC GRAVITY,UA: 1.02
SL AMB POCT URINE PROTEIN: NORMAL
SL AMB POCT UROBILINOGEN: NORMAL
SL AMB POCT WET MOUNT: ABNORMAL
T VAGINALIS VAG QL WET PREP: NEGATIVE
YEAST VAG QL WET PREP: ABNORMAL

## 2024-06-20 PROCEDURE — 99203 OFFICE O/P NEW LOW 30 MIN: CPT | Performed by: NURSE PRACTITIONER

## 2024-06-20 PROCEDURE — 87491 CHLMYD TRACH DNA AMP PROBE: CPT | Performed by: NURSE PRACTITIONER

## 2024-06-20 PROCEDURE — 87591 N.GONORRHOEAE DNA AMP PROB: CPT | Performed by: NURSE PRACTITIONER

## 2024-06-20 PROCEDURE — 87210 SMEAR WET MOUNT SALINE/INK: CPT | Performed by: NURSE PRACTITIONER

## 2024-06-20 PROCEDURE — 87086 URINE CULTURE/COLONY COUNT: CPT | Performed by: NURSE PRACTITIONER

## 2024-06-20 PROCEDURE — 81002 URINALYSIS NONAUTO W/O SCOPE: CPT | Performed by: NURSE PRACTITIONER

## 2024-06-20 RX ORDER — FLUCONAZOLE 150 MG/1
TABLET ORAL
Qty: 2 TABLET | Refills: 0 | Status: SHIPPED | OUTPATIENT
Start: 2024-06-20 | End: 2024-06-27

## 2024-06-20 NOTE — PROGRESS NOTES
"Assessment/Plan:     1. Candida vaginitis  Buds noted on wet mount.  Rx sent for oral fluconazole  Will reassess for resolution when she returns for yearly visit in July.    - fluconazole (DIFLUCAN) 150 mg tablet; Repeat 2nd dose in 3 days if needed  Dispense: 2 tablet; Refill: 0    2. UTI symptoms  + protein on dip, however symptomatic for dysuria.  Send urine culture to rule out UTI.    - POCT urine dip  - Urine culture    3. Screening examination for STD (sexually transmitted disease)    - Chlamydia/GC amplified DNA by PCR        Subjective:     Patient ID: Ling Cox is a 24 y.o. female.    HPI  NEW PATIENT PROBLEM  CC: urogenital symptoms    25 yo G0 presents with complaint of urinary and vagina symptoms.  Reports vaginal discharge (white), itching and burning.  She also notes an odor.  Reports painful and frequent urination and sex has been painful.  Going on for 3 weeks.    Stopped OCP last month as she did not want to take anymore, she had excessive hair loss.  Using condoms for birth control, which is sufficient for now.    3/2020 pap negative; vaginal culture + candida  2/2022 positive chlamydia; vaginal culture negative  3/2023 G/C negative  7/2023 Nexplanon removed due to ongoing bleeding; started OCP.    Review of Systems   Constitutional:  Negative for chills and fever.   Genitourinary:  Positive for dyspareunia (related to infection), dysuria, frequency and vaginal discharge. Negative for difficulty urinating, genital sores, menstrual problem, pelvic pain, vaginal bleeding and vaginal pain.         Objective:    Visit Vitals  /74 (BP Location: Right arm, Patient Position: Sitting, Cuff Size: Standard)   Ht 5' 1\" (1.549 m)   Wt 44.9 kg (99 lb)   LMP 05/31/2024 (Exact Date)   BMI 18.71 kg/m²   OB Status Having periods   Smoking Status Never   BSA 1.4 m²     Wet mount: + buds  Urine dip: + protein     Physical Exam  Constitutional:       General: She is not in acute distress.     " Appearance: Normal appearance. She is well-developed. She is not ill-appearing or diaphoretic.      Comments: Bmi 18.7   HENT:      Head: Normocephalic and atraumatic.   Eyes:      Pupils: Pupils are equal, round, and reactive to light.   Pulmonary:      Effort: Pulmonary effort is normal.   Abdominal:      General: Abdomen is flat.      Palpations: Abdomen is soft.   Genitourinary:     General: Normal vulva.      Exam position: Lithotomy position.      Labia:         Right: No rash, tenderness, lesion or injury.         Left: No rash, tenderness, lesion or injury.       Vagina: No signs of injury and foreign body. Vaginal discharge (white) present. No erythema, tenderness or bleeding.      Cervix: No cervical motion tenderness, discharge or friability.      Uterus: Not enlarged and not tender.       Adnexa:         Right: No mass or tenderness.          Left: No mass or tenderness.        Comments: Speculum and digital exam uncomfortable for patient.  Skin:     General: Skin is warm and dry.   Neurological:      General: No focal deficit present.      Mental Status: She is alert and oriented to person, place, and time.   Psychiatric:         Mood and Affect: Mood normal.         Behavior: Behavior normal.         Thought Content: Thought content normal.         Judgment: Judgment normal.

## 2024-06-21 LAB
BACTERIA UR CULT: NORMAL
C TRACH DNA SPEC QL NAA+PROBE: NEGATIVE
N GONORRHOEA DNA SPEC QL NAA+PROBE: NEGATIVE

## 2024-07-23 ENCOUNTER — OFFICE VISIT (OUTPATIENT)
Dept: OBGYN CLINIC | Facility: CLINIC | Age: 24
End: 2024-07-23
Payer: COMMERCIAL

## 2024-07-23 VITALS
HEIGHT: 61 IN | WEIGHT: 99.6 LBS | DIASTOLIC BLOOD PRESSURE: 68 MMHG | SYSTOLIC BLOOD PRESSURE: 100 MMHG | BODY MASS INDEX: 18.81 KG/M2

## 2024-07-23 DIAGNOSIS — Z12.4 ENCOUNTER FOR PAPANICOLAOU SMEAR FOR CERVICAL CANCER SCREENING: ICD-10-CM

## 2024-07-23 DIAGNOSIS — Z01.419 ENCOUNTER FOR GYNECOLOGICAL EXAMINATION WITHOUT ABNORMAL FINDING: Primary | ICD-10-CM

## 2024-07-23 PROCEDURE — S0612 ANNUAL GYNECOLOGICAL EXAMINA: HCPCS | Performed by: NURSE PRACTITIONER

## 2024-07-23 PROCEDURE — 87624 HPV HI-RISK TYP POOLED RSLT: CPT | Performed by: NURSE PRACTITIONER

## 2024-07-23 PROCEDURE — G0145 SCR C/V CYTO,THINLAYER,RESCR: HCPCS | Performed by: PATHOLOGY

## 2024-07-23 NOTE — PROGRESS NOTES
Assessment / Plan    1. Encounter for gynecological examination without abnormal finding  Normal well woman exam.  Pap smear updated.  Condoms for birth control.    2. Encounter for Papanicolaou smear for cervical cancer screening    - Liquid-based pap, screening        Subjective      Ling Cox is a 24 y.o. female who presents for her annual gynecologic exam.    23 yo G0  Seen 22 treated for vaginal yeast infection.  Reports that symptoms are much improved.    Last pap: 3/2020, negative  Pap hx: NL  STD screenin2024 negative  STD hx:  , chlamydia  Sexually active: yes,   Condom use: condoms  Gardasil vaccine: completed  Nutrition: bmi 18.8  Calcium intake: given guidelines    Exercise: no; given guidelines  Safety: feels safe    Periods are usually regular, d/bar OCP 2024  Current contraception: condoms  History of abnormal Pap smear: no  Family history of breast,uterine, ovarian or colon cancer: no    Menstrual History:  OB History          0    Para   0    Term   0       0    AB   0    Living   0         SAB   0    IAB   0    Ectopic   0    Multiple   0    Live Births   0           Obstetric Comments   Menarche 10  Periods historically regular; just stopped OCP 2024.  Gardasil completed                Patient's last menstrual period was 2024 (approximate).       The following portions of the patient's history were reviewed and updated as appropriate: allergies, current medications, past family history, past medical history, past social history, past surgical history, and problem list.    Review of Systems      Review of Systems   Constitutional:  Negative for chills and fever.   Respiratory:  Negative for cough and shortness of breath.    Gastrointestinal:  Negative for abdominal distention, abdominal pain, blood in stool, constipation, diarrhea, nausea and vomiting.   Genitourinary:  Negative for difficulty urinating, dysuria, frequency, genital sores,  "hematuria, menstrual problem, pelvic pain, urgency, vaginal bleeding and vaginal discharge.   Musculoskeletal:  Negative for arthralgias and myalgias.     Breasts:  Negative for skin changes, dimpling, asymmetry, nipple discharge, redness, tenderness or palpable masses    Objective     *patient agrees to exam without a chaperone present.     /68 (BP Location: Right arm, Patient Position: Sitting, Cuff Size: Standard)   Ht 5' 1\" (1.549 m)   Wt 45.2 kg (99 lb 9.6 oz)   LMP 06/25/2024 (Approximate)   BMI 18.82 kg/m²   Physical Exam  Constitutional:       General: She is not in acute distress.     Appearance: Normal appearance. She is well-developed and normal weight. She is not ill-appearing or diaphoretic.   HENT:      Head: Normocephalic and atraumatic.   Eyes:      Pupils: Pupils are equal, round, and reactive to light.   Neck:      Thyroid: No thyromegaly.   Pulmonary:      Effort: Pulmonary effort is normal.   Chest:   Breasts:     Breasts are symmetrical.      Right: No inverted nipple, mass, nipple discharge, skin change or tenderness.      Left: No inverted nipple, mass, nipple discharge, skin change or tenderness.   Abdominal:      General: There is no distension.      Palpations: Abdomen is soft. There is no mass.      Tenderness: There is no abdominal tenderness. There is no guarding or rebound.   Genitourinary:     General: Normal vulva.      Exam position: Lithotomy position.      Labia:         Right: No rash, tenderness, lesion or injury.         Left: No rash, tenderness, lesion or injury.       Vagina: No signs of injury and foreign body. No vaginal discharge, erythema, tenderness or bleeding.      Cervix: No cervical motion tenderness, discharge or friability.      Uterus: Not enlarged and not tender.       Adnexa:         Right: No mass or tenderness.          Left: No mass or tenderness.     Musculoskeletal:      Cervical back: Neck supple.   Lymphadenopathy:      Cervical: No cervical " adenopathy.      Upper Body:      Right upper body: No supraclavicular adenopathy.      Left upper body: No supraclavicular adenopathy.   Skin:     General: Skin is warm and dry.   Neurological:      General: No focal deficit present.      Mental Status: She is alert and oriented to person, place, and time.   Psychiatric:         Mood and Affect: Mood normal.         Behavior: Behavior normal.         Thought Content: Thought content normal.         Judgment: Judgment normal.

## 2024-07-30 LAB
HPV HR 12 DNA CVX QL NAA+PROBE: NEGATIVE
HPV16 DNA CVX QL NAA+PROBE: NEGATIVE
HPV18 DNA CVX QL NAA+PROBE: NEGATIVE
LAB AP GYN PRIMARY INTERPRETATION: ABNORMAL
Lab: ABNORMAL
PATH INTERP SPEC-IMP: ABNORMAL

## 2024-12-04 ENCOUNTER — OFFICE VISIT (OUTPATIENT)
Dept: URGENT CARE | Facility: CLINIC | Age: 24
End: 2024-12-04

## 2024-12-04 DIAGNOSIS — Z20.828 EXPOSURE TO THE FLU: Primary | ICD-10-CM

## 2024-12-04 RX ORDER — OSELTAMIVIR PHOSPHATE 75 MG/1
75 CAPSULE ORAL EVERY 12 HOURS SCHEDULED
Qty: 10 CAPSULE | Refills: 0 | Status: SHIPPED | OUTPATIENT
Start: 2024-12-04 | End: 2024-12-09

## 2024-12-04 NOTE — PROGRESS NOTES
I did not physically examine the patient.  Patient was exposed to a coworker who tested positive for influenza.  Patient works in close proximity to her and was in close proximity to her for 2 days.  Flu vaccination offered.  Tamiflu prophylaxis requested and sent to pharmacy.

## 2025-07-08 ENCOUNTER — OFFICE VISIT (OUTPATIENT)
Dept: URGENT CARE | Facility: CLINIC | Age: 25
End: 2025-07-08

## 2025-07-08 VITALS
HEART RATE: 97 BPM | TEMPERATURE: 98.2 F | DIASTOLIC BLOOD PRESSURE: 63 MMHG | SYSTOLIC BLOOD PRESSURE: 125 MMHG | RESPIRATION RATE: 16 BRPM

## 2025-07-08 DIAGNOSIS — J06.9 URI WITH COUGH AND CONGESTION: Primary | ICD-10-CM

## 2025-07-08 RX ORDER — AZITHROMYCIN 250 MG/1
TABLET, FILM COATED ORAL
Qty: 6 TABLET | Refills: 0 | Status: SHIPPED | OUTPATIENT
Start: 2025-07-08 | End: 2025-07-12

## 2025-07-08 RX ORDER — DEXTROMETHORPHAN HYDROBROMIDE AND PROMETHAZINE HYDROCHLORIDE 15; 6.25 MG/5ML; MG/5ML
5 SYRUP ORAL 4 TIMES DAILY PRN
Qty: 118 ML | Refills: 0 | Status: SHIPPED | OUTPATIENT
Start: 2025-07-08

## 2025-07-08 NOTE — PROGRESS NOTES
[facility]  Name: Ling Cox      : 2000      MRN: [id number]  Encounter Provider: [place] [facility]  Encounter Date: 2025   Encounter department: [facility]  :  Assessment & Plan  URI with cough and congestion    Orders:    azithromycin (ZITHROMAX) 250 mg tablet; Take 2 tablets today then 1 tablet daily x 4 days    promethazine-dextromethorphan (PHENERGAN-DM) 6.25-15 mg/5 mL oral syrup; Take 5 mL by mouth 4 (four) times a day as needed for cough    Advised will treat with z pack, take as directed. Can continue tessalon during day with cough drops and promethazine hs, do not take if driving due to sedative effects. Rest and hydrate.     Patient Instructions  Follow up with PCP in 3-5 days.  Proceed to  ER if symptoms worsen.    If tests are performed, our office will contact you with results only if changes need to made to the care plan discussed with you at the visit. You can review your full results on [facility].    Chief Complaint:   Chief Complaint   Patient presents with    Cold Like Symptoms     History of Present Illness   Was seen by pcp one week ago and given cough medication, tessalon perls which are helping the cough. States cough was much worse but still bad at night. Today feeling much worse and congested.     URI   This is a new problem. The current episode started in the past 7 days. The problem has been gradually worsening. There has been no fever. Associated symptoms include coughing, headaches and a plugged ear sensation. Pertinent negatives include no abdominal pain, chest pain, congestion, diarrhea, dysuria, ear pain, joint pain, joint swelling, nausea, neck pain, rash, rhinorrhea, sinus pain, sneezing, sore throat, swollen glands, vomiting or wheezing. The treatment provided no relief.     History obtained from: patient    Review of Systems   Constitutional:  Positive for fatigue. Negative for  chills and fever.   HENT:  Negative for congestion, ear pain, rhinorrhea, sinus pain, sneezing and sore throat.    Respiratory:  Positive for cough. Negative for chest tightness, shortness of breath and wheezing.    Cardiovascular:  Negative for chest pain.   Gastrointestinal:  Negative for abdominal pain, diarrhea, nausea and vomiting.   Genitourinary:  Negative for dysuria.   Musculoskeletal:  Positive for arthralgias and myalgias. Negative for joint pain and neck pain.   Skin:  Negative for rash.   Neurological:  Positive for headaches.   Psychiatric/Behavioral:  Positive for sleep disturbance.      Past Medical History   Past Medical History[1]  Past Surgical History[2]  Family History[3]  she reports that she has never smoked. She has never used smokeless tobacco. She reports that she does not drink alcohol and does not use drugs.  Current Outpatient Medications   Medication Instructions    azithromycin (ZITHROMAX) 250 mg tablet Take 2 tablets today then 1 tablet daily x 4 days    celecoxib (CELEBREX) 200 mg, Oral, Daily, With food/breakfast    dicyclomine (BENTYL) 10 mg, Oral, 3 times daily PRN    ergocalciferol (VITAMIN D2) 50,000 Units, Oral, Weekly    omeprazole (PriLOSEC) 20 mg delayed release capsule TAKE 1 CAPSULE BY MOUTH EVERY DAY 30 MINUTES TO 1 HOUR BEFORE A MEAL    ondansetron (ZOFRAN) 4 mg, Oral, Every 8 hours PRN    promethazine-dextromethorphan (PHENERGAN-DM) 6.25-15 mg/5 mL oral syrup 5 mL, Oral, 4 times daily PRN   Allergies[4]     Objective   /63 (BP Location: Left arm, Patient Position: Sitting, Cuff Size: Standard)   Pulse 97   Temp 98.2 °F (36.8 °C) (Tympanic)   Resp 16      Physical Exam  Vitals and nursing note reviewed.   Constitutional:       General: She is not in acute distress.     Appearance: Normal appearance. She is ill-appearing.   HENT:      Head: Normocephalic and atraumatic.      Right Ear: Tympanic membrane, ear canal and external ear normal.      Left Ear: Tympanic  "membrane, ear canal and external ear normal.      Nose: Congestion and rhinorrhea present.      Mouth/Throat:      Mouth: Mucous membranes are moist.      Pharynx: Oropharynx is clear. No oropharyngeal exudate or posterior oropharyngeal erythema.     Eyes:      Pupils: Pupils are equal, round, and reactive to light.       Cardiovascular:      Rate and Rhythm: Normal rate and regular rhythm.      Pulses: Normal pulses.      Heart sounds: Normal heart sounds. No murmur heard.  Pulmonary:      Effort: Pulmonary effort is normal. No respiratory distress.      Breath sounds: Normal breath sounds. No wheezing.      Comments: Dry persistent cough present    Skin:     General: Skin is warm.      Capillary Refill: Capillary refill takes less than 2 seconds.     Neurological:      General: No focal deficit present.      Mental Status: She is alert and oriented to person, place, and time.     Psychiatric:         Mood and Affect: Mood normal.         Administrative Statements   I have spent a total time of 30 minutes in caring for this patient on the day of the visit/encounter including Prognosis, Risks and benefits of tx options, Instructions for management, Patient and family education, Importance of tx compliance, Impressions, Documenting in the medical record, Reviewing/placing orders in the medical record (including tests, medications, and/or procedures), and Obtaining or reviewing history  .Portions of the record may have been created with voice recognition software.  Occasional wrong word or \"sound a like\" substitutions may have occurred due to the inherent limitations of voice recognition software.  Read the chart carefully and recognize, using context, where substitutions have occurred.       [1]   Past Medical History:  Diagnosis Date    Chlamydia 2023    Treated   [2]   Past Surgical History:  Procedure Laterality Date    APPENDECTOMY      NOSE SURGERY     [3]   Family History  Problem Relation Name Age of Onset    " Cancer Maternal Grandfather Boo     Lung cancer Paternal Grandfather David     Cancer Paternal Grandfather David     Breast cancer Neg Hx      Colon cancer Neg Hx      Ovarian cancer Neg Hx      Uterine cancer Neg Hx     [4] No Known Allergies

## 2025-07-17 ENCOUNTER — OFFICE VISIT (OUTPATIENT)
Dept: URGENT CARE | Facility: CLINIC | Age: 25
End: 2025-07-17

## 2025-07-17 VITALS
OXYGEN SATURATION: 99 % | DIASTOLIC BLOOD PRESSURE: 78 MMHG | SYSTOLIC BLOOD PRESSURE: 112 MMHG | HEART RATE: 94 BPM | TEMPERATURE: 99.3 F

## 2025-07-17 DIAGNOSIS — R05.2 SUBACUTE COUGH: Primary | ICD-10-CM

## 2025-07-17 RX ORDER — PREDNISONE 10 MG/1
TABLET ORAL
Qty: 30 TABLET | Refills: 0 | Status: SHIPPED | OUTPATIENT
Start: 2025-07-17 | End: 2025-07-29

## 2025-07-17 RX ORDER — ALBUTEROL SULFATE 90 UG/1
2 INHALANT RESPIRATORY (INHALATION) EVERY 6 HOURS PRN
Qty: 8.5 G | Refills: 0 | Status: SHIPPED | OUTPATIENT
Start: 2025-07-17

## 2025-07-17 NOTE — PROGRESS NOTES
[facility]  Name: Ling Cox      : 2000      MRN: [id number]  Encounter Provider: [place] [facility]  Encounter Date: 2025   Encounter department: [facility]  :  Assessment & Plan  Subacute cough    Orders:  •  Mini neb  •  predniSONE 10 mg tablet; Take 4 tablets (40 mg total) by mouth daily for 3 days, THEN 3 tablets (30 mg total) daily for 3 days, THEN 2 tablets (20 mg total) daily for 3 days, THEN 1 tablet (10 mg total) daily for 3 days.  •  albuterol (ProAir HFA) 90 mcg/act inhaler; Inhale 2 puffs every 6 (six) hours as needed for shortness of breath (on exertion)      Mini neb    Performed by: LUCERO Ken  Authorized by: LUCERO Ken    Universal Protocol:  Consent: Verbal consent obtained  Risks and benefits: risks, benefits and alternatives were discussed  Consent given by: patient  Patient understanding: patient states understanding of the procedure being performed  Required items: required blood products, implants, devices, and special equipment available  Patient identity confirmed: verbally with patient    Number of treatments:  1  Treatment 1:   Pre-Procedure     Symptoms:  Cough    Lung Sounds:  Clear    HR:  100    RR:  18    SP02:  99    Medication Administered:  Albuterol 2.5 mg  Nebulizer Comments:  Improvement of cough        Patient Instructions  Follow up with PCP in 3-5 days.  Proceed to ER if symptoms worsen.    If tests are performed, our office will contact you with results only if changes need to made to the care plan discussed with you at the visit. You can review your full results on [facility].    Chief Complaint:   Chief Complaint   Patient presents with   • Cough     History of Present Illness     Pt is a 24 y/o F who presents to the clinic for a CC of a ongoing cough.     Pt reports she saw her PCP for a dry cough, was tested for COVID/Flu which were negative, was  prescribed Tessalon Pearles. Pt reports that Tessalon did nothing and it did not help. Was told it was most likely viral.     Pt was seen at this clinic by another provider on 7/8/25, pt reports severe nasal congestion and ongoing cough prompted her to get evaluated. Associated symptoms included a HA and plugged ear sensation. Pt was dx with a URI with cough and congestion. Pt prescribed Azithromycin and Phenergan-DM.     Cough  Associated symptoms include postnasal drip (minimal), rhinorrhea (minimal) and shortness of breath. Pertinent negatives include no chest pain, chills, ear pain, fever, headaches, myalgias, rash, sore throat or wheezing.             Review of Systems   Constitutional:  Negative for appetite change, chills, diaphoresis, fatigue and fever.   HENT:  Positive for congestion (minimal), postnasal drip (minimal) and rhinorrhea (minimal). Negative for ear discharge, ear pain, sinus pressure, sinus pain and sore throat.    Respiratory:  Positive for cough and shortness of breath. Negative for wheezing.    Cardiovascular:  Negative for chest pain and palpitations.   Gastrointestinal:  Negative for abdominal pain, constipation, diarrhea, nausea and vomiting.   Musculoskeletal:  Negative for myalgias.   Skin:  Negative for color change, rash and wound.   Neurological:  Negative for headaches.     Past Medical History   Past Medical History[1]  Past Surgical History[1]  Family History[1]  she reports that she has never smoked. She has never used smokeless tobacco. She reports that she does not drink alcohol and does not use drugs.  Current Outpatient Medications   Medication Instructions   • albuterol (ProAir HFA) 90 mcg/act inhaler 2 puffs, Inhalation, Every 6 hours PRN   • predniSONE 10 mg tablet Take 4 tablets (40 mg total) by mouth daily for 3 days, THEN 3 tablets (30 mg total) daily for 3 days, THEN 2 tablets (20 mg total) daily for 3 days, THEN 1 tablet (10 mg total) daily for 3 days.   •  "promethazine-dextromethorphan (PHENERGAN-DM) 6.25-15 mg/5 mL oral syrup 5 mL, Oral, 4 times daily PRN   Allergies[1]     Objective   /78 (BP Location: Left arm, Patient Position: Sitting, Cuff Size: Standard)   Pulse 94   Temp 99.3 °F (37.4 °C) (Tympanic)   SpO2 99%      Physical Exam  Vitals and nursing note reviewed.   Constitutional:       General: She is not in acute distress.     Appearance: Normal appearance. She is not ill-appearing, toxic-appearing or diaphoretic.   HENT:      Head: Normocephalic.      Right Ear: Tympanic membrane, ear canal and external ear normal. There is no impacted cerumen.      Left Ear: Tympanic membrane, ear canal and external ear normal. There is no impacted cerumen.      Nose: Congestion (minimal) present.      Mouth/Throat:      Pharynx: No posterior oropharyngeal erythema.     Cardiovascular:      Rate and Rhythm: Normal rate and regular rhythm.      Pulses: Normal pulses.      Heart sounds: Normal heart sounds. No murmur heard.  Pulmonary:      Effort: Pulmonary effort is normal. No respiratory distress.      Breath sounds: Normal breath sounds. No stridor. No wheezing, rhonchi or rales.      Comments: Dry persistent non-productive cough noted throughout examination  Chest:      Chest wall: No tenderness.     Musculoskeletal:         General: Normal range of motion.   Lymphadenopathy:      Cervical: Cervical adenopathy present.      Right cervical: Superficial cervical adenopathy present.      Left cervical: Superficial cervical adenopathy present.     Skin:     General: Skin is warm.     Neurological:      Mental Status: She is alert.         Portions of the record may have been created with voice recognition software.  Occasional wrong word or \"sound a like\" substitutions may have occurred due to the inherent limitations of voice recognition software.  Read the chart carefully and recognize, using context, where substitutions have occurred.         [1]  Past Medical " History:  Diagnosis Date   • Chlamydia 2023    Treated   [1]  Past Surgical History:  Procedure Laterality Date   • APPENDECTOMY     • NOSE SURGERY     [1]  Family History  Problem Relation Name Age of Onset   • Cancer Maternal Grandfather Boo    • Lung cancer Paternal Grandfather David    • Cancer Paternal Grandfather David    • Breast cancer Neg Hx     • Colon cancer Neg Hx     • Ovarian cancer Neg Hx     • Uterine cancer Neg Hx     [1]  No Known Allergies